# Patient Record
Sex: MALE | Race: WHITE | NOT HISPANIC OR LATINO | Employment: UNEMPLOYED | URBAN - NONMETROPOLITAN AREA
[De-identification: names, ages, dates, MRNs, and addresses within clinical notes are randomized per-mention and may not be internally consistent; named-entity substitution may affect disease eponyms.]

---

## 2017-04-28 ENCOUNTER — APPOINTMENT (EMERGENCY)
Dept: CT IMAGING | Facility: HOSPITAL | Age: 37
End: 2017-04-28
Payer: COMMERCIAL

## 2017-04-28 ENCOUNTER — HOSPITAL ENCOUNTER (EMERGENCY)
Facility: HOSPITAL | Age: 37
Discharge: HOME | End: 2017-04-29
Attending: EMERGENCY MEDICINE
Payer: COMMERCIAL

## 2017-04-28 DIAGNOSIS — E86.0 DEHYDRATION: ICD-10-CM

## 2017-04-28 DIAGNOSIS — R51.9 HEADACHE: Primary | ICD-10-CM

## 2017-04-28 LAB
ALBUMIN SERPL BCP-MCNC: 4.1 G/DL (ref 3.5–5)
ALP SERPL-CCNC: 71 U/L (ref 46–116)
ALT SERPL W P-5'-P-CCNC: 21 U/L (ref 12–78)
ANION GAP SERPL CALCULATED.3IONS-SCNC: 9 MMOL/L (ref 4–13)
AST SERPL W P-5'-P-CCNC: 15 U/L (ref 5–45)
BASOPHILS # BLD AUTO: 0.02 THOUSANDS/ΜL (ref 0–0.1)
BASOPHILS NFR BLD AUTO: 0 % (ref 0–1)
BILIRUB SERPL-MCNC: 0.5 MG/DL (ref 0.2–1)
BUN SERPL-MCNC: 12 MG/DL (ref 5–25)
CALCIUM SERPL-MCNC: 8.6 MG/DL (ref 8.3–10.1)
CHLORIDE SERPL-SCNC: 106 MMOL/L (ref 100–108)
CO2 SERPL-SCNC: 26 MMOL/L (ref 21–32)
CREAT SERPL-MCNC: 1.01 MG/DL (ref 0.6–1.3)
EOSINOPHIL # BLD AUTO: 0.02 THOUSAND/ΜL (ref 0–0.61)
EOSINOPHIL NFR BLD AUTO: 0 % (ref 0–6)
ERYTHROCYTE [DISTWIDTH] IN BLOOD BY AUTOMATED COUNT: 13.3 % (ref 11.6–15.1)
GFR SERPL CREATININE-BSD FRML MDRD: >60 ML/MIN/1.73SQ M
GLUCOSE SERPL-MCNC: 125 MG/DL (ref 65–140)
HCT VFR BLD AUTO: 39.7 % (ref 36.5–49.3)
HGB BLD-MCNC: 13.2 G/DL (ref 12–17)
LYMPHOCYTES # BLD AUTO: 1.38 THOUSANDS/ΜL (ref 0.6–4.47)
LYMPHOCYTES NFR BLD AUTO: 10 % (ref 14–44)
MCH RBC QN AUTO: 30.1 PG (ref 26.8–34.3)
MCHC RBC AUTO-ENTMCNC: 33.2 G/DL (ref 31.4–37.4)
MCV RBC AUTO: 90 FL (ref 82–98)
MONOCYTES # BLD AUTO: 0.82 THOUSAND/ΜL (ref 0.17–1.22)
MONOCYTES NFR BLD AUTO: 6 % (ref 4–12)
NEUTROPHILS # BLD AUTO: 11.69 THOUSANDS/ΜL (ref 1.85–7.62)
NEUTS SEG NFR BLD AUTO: 84 % (ref 43–75)
PLATELET # BLD AUTO: 242 THOUSANDS/UL (ref 149–390)
PMV BLD AUTO: 10.1 FL (ref 8.9–12.7)
POTASSIUM SERPL-SCNC: 3.3 MMOL/L (ref 3.5–5.3)
PROT SERPL-MCNC: 7.1 G/DL (ref 6.4–8.2)
RBC # BLD AUTO: 4.39 MILLION/UL (ref 3.88–5.62)
SODIUM SERPL-SCNC: 141 MMOL/L (ref 136–145)
WBC # BLD AUTO: 13.93 THOUSAND/UL (ref 4.31–10.16)

## 2017-04-28 PROCEDURE — 96361 HYDRATE IV INFUSION ADD-ON: CPT

## 2017-04-28 PROCEDURE — 80053 COMPREHEN METABOLIC PANEL: CPT | Performed by: EMERGENCY MEDICINE

## 2017-04-28 PROCEDURE — 70450 CT HEAD/BRAIN W/O DYE: CPT

## 2017-04-28 PROCEDURE — 36415 COLL VENOUS BLD VENIPUNCTURE: CPT | Performed by: EMERGENCY MEDICINE

## 2017-04-28 PROCEDURE — 85025 COMPLETE CBC W/AUTO DIFF WBC: CPT | Performed by: EMERGENCY MEDICINE

## 2017-04-28 PROCEDURE — 96374 THER/PROPH/DIAG INJ IV PUSH: CPT

## 2017-04-28 PROCEDURE — 96375 TX/PRO/DX INJ NEW DRUG ADDON: CPT

## 2017-04-28 RX ORDER — DIPHENHYDRAMINE HYDROCHLORIDE 50 MG/ML
50 INJECTION INTRAMUSCULAR; INTRAVENOUS ONCE
Status: COMPLETED | OUTPATIENT
Start: 2017-04-28 | End: 2017-04-28

## 2017-04-28 RX ORDER — KETOROLAC TROMETHAMINE 30 MG/ML
30 INJECTION, SOLUTION INTRAMUSCULAR; INTRAVENOUS ONCE
Status: COMPLETED | OUTPATIENT
Start: 2017-04-28 | End: 2017-04-28

## 2017-04-28 RX ORDER — ONDANSETRON 2 MG/ML
4 INJECTION INTRAMUSCULAR; INTRAVENOUS ONCE
Status: COMPLETED | OUTPATIENT
Start: 2017-04-28 | End: 2017-04-28

## 2017-04-28 RX ORDER — ACETAMINOPHEN 325 MG/1
650 TABLET ORAL ONCE
Status: COMPLETED | OUTPATIENT
Start: 2017-04-28 | End: 2017-04-28

## 2017-04-28 RX ADMIN — DIPHENHYDRAMINE HYDROCHLORIDE 50 MG: 50 INJECTION, SOLUTION INTRAMUSCULAR; INTRAVENOUS at 23:57

## 2017-04-28 RX ADMIN — ACETAMINOPHEN 650 MG: 325 TABLET, FILM COATED ORAL at 22:42

## 2017-04-28 RX ADMIN — SODIUM CHLORIDE 2000 ML: 0.9 INJECTION, SOLUTION INTRAVENOUS at 22:44

## 2017-04-28 RX ADMIN — KETOROLAC TROMETHAMINE 30 MG: 30 INJECTION, SOLUTION INTRAMUSCULAR at 23:57

## 2017-04-28 RX ADMIN — ONDANSETRON 4 MG: 2 INJECTION INTRAMUSCULAR; INTRAVENOUS at 23:58

## 2017-04-29 VITALS
TEMPERATURE: 97.9 F | HEIGHT: 69 IN | RESPIRATION RATE: 16 BRPM | DIASTOLIC BLOOD PRESSURE: 70 MMHG | BODY MASS INDEX: 20.08 KG/M2 | WEIGHT: 135.58 LBS | HEART RATE: 57 BPM | OXYGEN SATURATION: 99 % | SYSTOLIC BLOOD PRESSURE: 118 MMHG

## 2017-04-29 PROCEDURE — 99284 EMERGENCY DEPT VISIT MOD MDM: CPT

## 2018-02-17 ENCOUNTER — APPOINTMENT (EMERGENCY)
Dept: CT IMAGING | Facility: HOSPITAL | Age: 38
End: 2018-02-17
Payer: COMMERCIAL

## 2018-02-17 ENCOUNTER — HOSPITAL ENCOUNTER (EMERGENCY)
Facility: HOSPITAL | Age: 38
Discharge: HOME | End: 2018-02-17
Admitting: EMERGENCY MEDICINE
Payer: COMMERCIAL

## 2018-02-17 VITALS
TEMPERATURE: 98.1 F | RESPIRATION RATE: 18 BRPM | HEIGHT: 69 IN | SYSTOLIC BLOOD PRESSURE: 120 MMHG | DIASTOLIC BLOOD PRESSURE: 69 MMHG | BODY MASS INDEX: 21.17 KG/M2 | WEIGHT: 142.9 LBS | OXYGEN SATURATION: 99 % | HEART RATE: 74 BPM

## 2018-02-17 DIAGNOSIS — R51.9 HEADACHE: Primary | ICD-10-CM

## 2018-02-17 LAB
ANION GAP SERPL CALCULATED.3IONS-SCNC: 7 MMOL/L (ref 4–13)
BASOPHILS # BLD AUTO: 0.02 THOUSANDS/ΜL (ref 0–0.1)
BASOPHILS NFR BLD AUTO: 0 % (ref 0–1)
BUN SERPL-MCNC: 18 MG/DL (ref 5–25)
CALCIUM SERPL-MCNC: 8.7 MG/DL (ref 8.3–10.1)
CHLORIDE SERPL-SCNC: 102 MMOL/L (ref 100–108)
CO2 SERPL-SCNC: 31 MMOL/L (ref 21–32)
CREAT SERPL-MCNC: 1.25 MG/DL (ref 0.6–1.3)
EOSINOPHIL # BLD AUTO: 0.07 THOUSAND/ΜL (ref 0–0.61)
EOSINOPHIL NFR BLD AUTO: 1 % (ref 0–6)
ERYTHROCYTE [DISTWIDTH] IN BLOOD BY AUTOMATED COUNT: 13.2 % (ref 11.6–15.1)
GAS + CO PNL BLDA: 1.7 % (ref 0–1.5)
GFR SERPL CREATININE-BSD FRML MDRD: 74 ML/MIN/1.73SQ M
GLUCOSE SERPL-MCNC: 89 MG/DL (ref 65–140)
HCT VFR BLD AUTO: 41.6 % (ref 36.5–49.3)
HGB BLD-MCNC: 14 G/DL (ref 12–17)
LYMPHOCYTES # BLD AUTO: 1.76 THOUSANDS/ΜL (ref 0.6–4.47)
LYMPHOCYTES NFR BLD AUTO: 18 % (ref 14–44)
MCH RBC QN AUTO: 30.8 PG (ref 26.8–34.3)
MCHC RBC AUTO-ENTMCNC: 33.7 G/DL (ref 31.4–37.4)
MCV RBC AUTO: 91 FL (ref 82–98)
MONOCYTES # BLD AUTO: 0.57 THOUSAND/ΜL (ref 0.17–1.22)
MONOCYTES NFR BLD AUTO: 6 % (ref 4–12)
NEUTROPHILS # BLD AUTO: 7.13 THOUSANDS/ΜL (ref 1.85–7.62)
NEUTS SEG NFR BLD AUTO: 75 % (ref 43–75)
PLATELET # BLD AUTO: 258 THOUSANDS/UL (ref 149–390)
PMV BLD AUTO: 9.8 FL (ref 8.9–12.7)
POTASSIUM SERPL-SCNC: 4 MMOL/L (ref 3.5–5.3)
RBC # BLD AUTO: 4.55 MILLION/UL (ref 3.88–5.62)
SODIUM SERPL-SCNC: 140 MMOL/L (ref 136–145)
WBC # BLD AUTO: 9.55 THOUSAND/UL (ref 4.31–10.16)

## 2018-02-17 PROCEDURE — 99284 EMERGENCY DEPT VISIT MOD MDM: CPT

## 2018-02-17 PROCEDURE — 96365 THER/PROPH/DIAG IV INF INIT: CPT

## 2018-02-17 PROCEDURE — 85025 COMPLETE CBC W/AUTO DIFF WBC: CPT | Performed by: PHYSICIAN ASSISTANT

## 2018-02-17 PROCEDURE — 80048 BASIC METABOLIC PNL TOTAL CA: CPT | Performed by: PHYSICIAN ASSISTANT

## 2018-02-17 PROCEDURE — 70450 CT HEAD/BRAIN W/O DYE: CPT

## 2018-02-17 PROCEDURE — 82375 ASSAY CARBOXYHB QUANT: CPT | Performed by: PHYSICIAN ASSISTANT

## 2018-02-17 PROCEDURE — 96375 TX/PRO/DX INJ NEW DRUG ADDON: CPT

## 2018-02-17 RX ORDER — KETOROLAC TROMETHAMINE 30 MG/ML
15 INJECTION, SOLUTION INTRAMUSCULAR; INTRAVENOUS ONCE
Status: COMPLETED | OUTPATIENT
Start: 2018-02-17 | End: 2018-02-17

## 2018-02-17 RX ORDER — MAGNESIUM SULFATE HEPTAHYDRATE 40 MG/ML
2 INJECTION, SOLUTION INTRAVENOUS ONCE
Status: DISCONTINUED | OUTPATIENT
Start: 2018-02-17 | End: 2018-02-17

## 2018-02-17 RX ORDER — DEXAMETHASONE SODIUM PHOSPHATE 10 MG/ML
10 INJECTION, SOLUTION INTRAMUSCULAR; INTRAVENOUS ONCE
Status: COMPLETED | OUTPATIENT
Start: 2018-02-17 | End: 2018-02-17

## 2018-02-17 RX ORDER — IBUPROFEN 200 MG
600 TABLET ORAL EVERY 6 HOURS PRN
Qty: 30 TABLET | Refills: 0 | Status: SHIPPED | OUTPATIENT
Start: 2018-02-17 | End: 2018-05-24 | Stop reason: ALTCHOICE

## 2018-02-17 RX ORDER — ACETAMINOPHEN 325 MG/1
650 TABLET ORAL EVERY 6 HOURS PRN
Qty: 30 TABLET | Refills: 0 | Status: SHIPPED | OUTPATIENT
Start: 2018-02-17 | End: 2018-05-24 | Stop reason: ALTCHOICE

## 2018-02-17 RX ORDER — MAGNESIUM SULFATE HEPTAHYDRATE 40 MG/ML
2 INJECTION, SOLUTION INTRAVENOUS ONCE
Status: COMPLETED | OUTPATIENT
Start: 2018-02-17 | End: 2018-02-17

## 2018-02-17 RX ADMIN — MAGNESIUM SULFATE HEPTAHYDRATE 2 G: 40 INJECTION, SOLUTION INTRAVENOUS at 17:56

## 2018-02-17 RX ADMIN — KETOROLAC TROMETHAMINE 15 MG: 30 INJECTION, SOLUTION INTRAMUSCULAR at 17:49

## 2018-02-17 RX ADMIN — SODIUM CHLORIDE 500 ML: 0.9 INJECTION, SOLUTION INTRAVENOUS at 17:53

## 2018-02-17 RX ADMIN — DEXAMETHASONE SODIUM PHOSPHATE 10 MG: 10 INJECTION, SOLUTION INTRAMUSCULAR; INTRAVENOUS at 17:51

## 2018-02-17 NOTE — DISCHARGE INSTRUCTIONS
Tension Headache   WHAT YOU NEED TO KNOW:   Tension headaches are most often caused by stress, eye strain, or muscle tightness  The pain of a tension headache may start in the forehead or the back of the head  The pain often spreads over the whole head and down into the neck and shoulders  Over-the-counter pain medicine is the most useful and common treatment for a tension headache  Exercise, biofeedback, meditation, or relaxation techniques may also decrease your headache pain  DISCHARGE INSTRUCTIONS:   Return to the emergency department if:   · You have a sudden headache that seems different or much worse than your usual headaches  · You have difficulty seeing, speaking, or moving  · You pass out, become confused, or have a seizure  · You have a headache, fever, and a stiff neck  Contact your healthcare provider if:   · Your headaches continue to get worse  · Your headaches happen so often that they affect your ability to do your work or normal activities  · You need to take medicine to help your headaches more often than your healthcare provider says you should  · Your headaches get so bad that they cause you to vomit  · You have questions or concerns about your condition or care  Medicines:   · NSAIDs , such as ibuprofen, help decrease swelling, pain, and fever  This medicine is available with or without a doctor's order  NSAIDs can cause stomach bleeding or kidney problems in certain people  If you take blood thinner medicine, always ask your healthcare provider if NSAIDs are safe for you  Always read the medicine label and follow directions  · Acetaminophen  decreases pain and fever  It is available without a doctor's order  Ask how much to take and how often to take it  Follow directions  Read the labels of all other medicines you are using to see if they also contain acetaminophen, or ask your doctor or pharmacist  Acetaminophen can cause liver damage if not taken correctly   Do not use more than 4 grams (4,000 milligrams) total of acetaminophen in one day  · Take your medicine as directed  Contact your healthcare provider if you think your medicine is not helping or if you have side effects  Tell him of her if you are allergic to any medicine  Keep a list of the medicines, vitamins, and herbs you take  Include the amounts, and when and why you take them  Bring the list or the pill bottles to follow-up visits  Carry your medicine list with you in case of an emergency  Manage your symptoms:   · Keep a headache record  Include when the headaches start and stop and what made them better  Describe your symptoms, such as how the pain feels, where it is, and how bad it is  Record anything you ate or drank for the past 24 hours before your headache  Bring this to follow-up visits  · Apply heat as directed  Heat may help decrease headache pain and muscle spasms  Apply heat on the area for 20 to 30 minutes every 2 hours for as many days as directed  A warm bath may also help relieve muscle tension and spasms  · Apply ice as directed  Ice may help decrease headache pain  Use an ice pack or put crushed ice in a plastic bag  Cover it with a towel and place it on the area for 15 to 20 minutes every hour as directed  Prevent tension headaches:   · Avoid muscle tension  Do not stay in one position for long periods of time  Use a different pillow if you wake up with sore neck and shoulder muscles  Find ways to relax your muscles, such as massage or resting in a quiet, dark room  · Avoid eye strain  Make sure you have good lighting when you read, sew, or do similar activities  Get yearly eye exams and wear glasses as directed  · Get enough sleep  Get 8 to 10 hours of sleep each night  Create a sleep schedule  Go to bed and wake up at the same times each day  It may be helpful to do something relaxing before bed  Do not watch television right before bed      · Eat a variety of healthy foods  Healthy foods include fruits, vegetables, whole-grain breads, low-fat dairy products, beans, lean meats, and fish  Do not eat foods that trigger your headaches  · Exercise regularly  Exercise helps decrease stress and headaches  Ask about the best exercise plan for you  · Drink liquids as directed  You may need to drink more liquid to prevent dehydration  Dehydration can make a tension headache worse  Ask your healthcare provider how much liquid to drink and which liquids are best for you  Limit caffeine as directed  Caffeine may make a tension headache worse  · Do not drink alcohol  Alcohol can trigger a headache  It can also prevent medicines from stopping your headache  · Do not smoke  Nicotine and other chemicals in cigarettes and cigars can trigger a headache and also cause lung damage  Ask your healthcare provider for information if you currently smoke and need help to quit  E-cigarettes or smokeless tobacco still contain nicotine  Talk to your healthcare provider before you use these products  Follow up with your healthcare provider as directed:  Bring the headache record with you  Write down your questions so you remember to ask them during your visits  © 2017 [facility] Information is for End User's use only and may not be sold, redistributed or otherwise used for commercial purposes  All illustrations and images included in CareNotes® are the copyrighted property of A D A M [employer]  or [address]  The above information is an  only  It is not intended as medical advice for individual conditions or treatments  Talk to your doctor, nurse or pharmacist before following any medical regimen to see if it is safe and effective for you  Acute Headache, Ambulatory Care   GENERAL INFORMATION:   An acute headache  is pain or discomfort that starts suddenly and gets worse quickly  The cause of an acute headache may not be known   It may be triggered by stress, fatigue, hormones, food, or trauma  Common related symptoms include the following:   · Fever    · Sinus pressure    · Loss of memory    · Nausea or vomiting    · Problems with your vision, such as watery or red eyes, loss of vision, or pain in bright light    · Stiff neck    · Tenderness of the head and neck area    · Trouble staying awake, or being less alert than usual     · Weakness or less energy  Seek immediate care for the following symptoms:   · Severe pain    · A headache that occurs after a blow to the head, a fall, or other trauma     · Confusion or forgetfulness    · Numbness on one side of your face or body  Treatment for an acute headache  may include medicine to decrease pain  You may also need biofeedback or cognitive behavioral therapy  Ask your healthcare provider about these and other treatments for an acute headache  Manage my symptoms:   · Apply heat  on your head for 20 to 30 minutes every 2 hours for as many days as directed  Heat helps decrease pain and muscle spasms  You may alternate heat and ice  · Apply ice  on your head for 15 to 20 minutes every hour or as directed  Use an ice pack, or put crushed ice in a plastic bag  Cover it with a towel  Ice helps decrease pain  · Relax your muscles  Lie down in a comfortable position and close your eyes  Relax your muscles slowly  Start at your toes and work your way up your body  · Keep a record of your headaches  Write down when your headaches start and stop  Include your symptoms and what you were doing when the headache began  Record what you ate or drank for 24 hours before the headache started  Describe the pain and where it hurts  Keep track of what you did to treat your headache and whether it worked  Follow up with your healthcare provider as directed:  Bring your headache record with you when you see your healthcare provider   Write down your questions so you remember to ask them during your visits  CARE AGREEMENT:   You have the right to help plan your care  Learn about your health condition and how it may be treated  Discuss treatment options with your caregivers to decide what care you want to receive  You always have the right to refuse treatment  The above information is an  only  It is not intended as medical advice for individual conditions or treatments  Talk to your doctor, nurse or pharmacist before following any medical regimen to see if it is safe and effective for you  © 2014 [address] is for End User's use only and may not be sold, redistributed or otherwise used for commercial purposes  All illustrations and images included in CareNotes® are the copyrighted property of [employer]  or Micha Jaime

## 2018-02-17 NOTE — ED PROVIDER NOTES
History  Chief Complaint   Patient presents with    Headache - Recurrent or Known Dx Migraines     Intermittent headache for three weeks       History provided by:  Patient and significant other  Headache   Pain location:  Generalized (top and back of head radiates to both sides and forehead)  Quality:  Dull  Radiates to:  Does not radiate  Severity currently:  9/10  Severity at highest:  10/10  Onset quality:  Gradual  Duration:  3 weeks  Timing:  Constant  Progression:  Unchanged  Chronicity:  New  Similar to prior headaches: yes    Context: not exposure to bright light, not caffeine, not intercourse and not loud noise    Context comment:  Headaches for 3 weeks  took 650mg of tylenol yesterday for first time which took the pain completely away for about 1-2 hours before it recurred  no injury at onset  no neck pain  no eye pain  no uri sx  no dental pain  no dx of migraines  not follow pcp  Relieved by:  Nothing  Worsened by:  Nothing  Ineffective treatments:  None tried  Associated symptoms: no abdominal pain, no back pain, no blurred vision, no congestion, no cough, no diarrhea, no dizziness, no ear pain, no eye pain, no fatigue, no fever, no hearing loss, no loss of balance, no myalgias, no nausea, no near-syncope, no neck pain, no neck stiffness, no numbness, no paresthesias, no photophobia, no seizures, no sinus pressure, no sore throat, no syncope, no tingling, no URI, no visual change, no vomiting and no weakness        None       Past Medical History:   Diagnosis Date    Asthma        History reviewed  No pertinent surgical history  History reviewed  No pertinent family history  I have reviewed and agree with the history as documented      Social History   Substance Use Topics    Smoking status: Never Smoker    Smokeless tobacco: Never Used    Alcohol use No        Review of Systems   Constitutional: Negative for activity change, appetite change, chills, diaphoresis, fatigue, fever and unexpected weight change  HENT: Negative for congestion, ear pain, hearing loss, rhinorrhea, sinus pressure, sore throat and tinnitus  Eyes: Negative for blurred vision, photophobia, pain and visual disturbance  Respiratory: Negative for cough, chest tightness, shortness of breath and wheezing  Cardiovascular: Negative for chest pain, palpitations, leg swelling, syncope and near-syncope  Gastrointestinal: Negative for abdominal pain, constipation, diarrhea, nausea and vomiting  Genitourinary: Negative for dysuria, flank pain, frequency, hematuria and urgency  Musculoskeletal: Negative for arthralgias, back pain, myalgias, neck pain and neck stiffness  Skin: Negative for rash and wound  Allergic/Immunologic: Negative for immunocompromised state  Neurological: Positive for headaches  Negative for dizziness, tremors, seizures, syncope, facial asymmetry, speech difficulty, weakness, light-headedness, numbness, paresthesias and loss of balance  Physical Exam  ED Triage Vitals [02/17/18 1642]   Temperature Pulse Respirations Blood Pressure SpO2   98 1 °F (36 7 °C) 74 18 135/75 97 %      Temp Source Heart Rate Source Patient Position - Orthostatic VS BP Location FiO2 (%)   Temporal Monitor Lying Left arm --      Pain Score       8           Orthostatic Vital Signs  Vitals:    02/17/18 1642 02/17/18 1700 02/17/18 1759   BP: 135/75 116/73 118/66   Pulse: 74 74 72   Patient Position - Orthostatic VS: Lying Lying Lying       Physical Exam   Constitutional: He is oriented to person, place, and time  He appears well-developed and well-nourished  No distress  HENT:   Head: Normocephalic and atraumatic  Right Ear: External ear normal    Left Ear: External ear normal    Nose: Nose normal    Mouth/Throat: Oropharynx is clear and moist    Eyes: Conjunctivae and EOM are normal  Pupils are equal, round, and reactive to light  Neck: Normal range of motion  Neck supple     Cardiovascular: Normal rate, regular rhythm, normal heart sounds and intact distal pulses  No murmur heard  Pulmonary/Chest: Effort normal and breath sounds normal    Abdominal: Soft  Bowel sounds are normal    Musculoskeletal: Normal range of motion  He exhibits no edema or tenderness  Neurological: He is alert and oriented to person, place, and time  Aaox4  Mental status normal and appropriate  Judgment and analogies appropriate  Cognition and memory intact  CN 2-12 intact   strength 5/5 bilat  Follows all commands, normal heel-down-shin and finger-to-nose coordination  Steady unassisted tandem gait  Sensation intact to light touch x 4 extremities  Skin: Skin is warm and dry  He is not diaphoretic  Psychiatric: He has a normal mood and affect  Nursing note and vitals reviewed  ED Medications  Medications   sodium chloride 0 9 % bolus 500 mL (0 mL Intravenous Stopped 2/17/18 1824)   ketorolac (TORADOL) injection 15 mg (15 mg Intravenous Given 2/17/18 1749)   dexamethasone (PF) (DECADRON) injection 10 mg (10 mg Intravenous Given 2/17/18 1751)   magnesium sulfate 2 g/50 mL IVPB (premix) 2 g (0 g Intravenous Stopped 2/17/18 1824)       Diagnostic Studies  Results Reviewed     Procedure Component Value Units Date/Time    Basic metabolic panel [[id number]] Collected:  02/17/18 1744    Lab Status:  Final result Specimen:  Blood from Arm, Right Updated:  02/17/18 1809     Sodium 140 mmol/L      Potassium 4 0 mmol/L      Chloride 102 mmol/L      CO2 31 mmol/L      Anion Gap 7 mmol/L      BUN 18 mg/dL      Creatinine 1 25 mg/dL      Glucose 89 mg/dL      Calcium 8 7 mg/dL      eGFR 74 ml/min/1 73sq m     Narrative:         National Kidney Disease Education Program recommendations are as follows:  GFR calculation is accurate only with a steady state creatinine  Chronic Kidney disease less than 60 ml/min/1 73 sq  meters  Kidney failure less than 15 ml/min/1 73 sq  meters      Carboxyhemoglobin [[id number]]  (Abnormal) Collected:  02/17/18 1744 Lab Status:  Final result Specimen:  Blood from Arm, Right Updated:  02/17/18 1806     Carbon Monoxide, Blood 1 7 (H) %     Narrative:         Normal Carboxyhemoglobin range for nonsmokers is <1 5%   Normal Carboxyhemoglobin range for smokers is 1 5% to 5 1%     CBC and differential [[id number]]  (Normal) Collected:  02/17/18 1744    Lab Status:  Final result Specimen:  Blood from Arm, Right Updated:  02/17/18 1800     WBC 9 55 Thousand/uL      RBC 4 55 Million/uL      Hemoglobin 14 0 g/dL      Hematocrit 41 6 %      MCV 91 fL      MCH 30 8 pg      MCHC 33 7 g/dL      RDW 13 2 %      MPV 9 8 fL      Platelets 523 Thousands/uL      Neutrophils Relative 75 %      Lymphocytes Relative 18 %      Monocytes Relative 6 %      Eosinophils Relative 1 %      Basophils Relative 0 %      Neutrophils Absolute 7 13 Thousands/µL      Lymphocytes Absolute 1 76 Thousands/µL      Monocytes Absolute 0 57 Thousand/µL      Eosinophils Absolute 0 07 Thousand/µL      Basophils Absolute 0 02 Thousands/µL                  CT head without contrast   Final Result by Donnie Huff MD (02/17 1732)      No acute intracranial abnormality           Workstation performed: [id number]                    Procedures  Procedures       Phone Contacts  ED Phone Contact    ED Course  ED Course as of Feb 17 1837   Sat Feb 17, 2018   1811 Carbon Monoxide, Blood: (!) 1 7   1811 WBC: 9 55   1811 Hemoglobin: 14 0   1811 Hematocrit: 41 6   1811 Platelets: 803   9667 Sodium: 140   1811 Potassium: 4 0   1811 Chloride: 102   1811 CO2: 31   1811 Anion Gap: 7   1811 BUN: 18   1811 Creatinine: 1 25   1811 Glucose: 89   1811 eGFR: 74       MDM  Number of Diagnoses or Management Options  Headache: new and requires workup     Amount and/or Complexity of Data Reviewed  Clinical lab tests: ordered and reviewed  Tests in the radiology section of CPT®: ordered and reviewed    Patient Progress  Patient progress: stable    CritCare Time    Disposition  Final diagnoses: Headache     Time reflects when diagnosis was documented in both MDM as applicable and the Disposition within this note     Time User Action Codes Description Comment    2/17/2018  6:12 PM Jose Alfredo 59, [address] [address] Headache       ED Disposition     ED Disposition Condition Comment    Discharge  Cindy Wells discharge to home/self care  Condition at discharge: Good        Follow-up Information     Follow up With Specialties Details Why [facility],  Family Medicine Schedule an appointment as soon as possible for a visit ER followup regarding headaches [address] [place] [address]  [phone]          Patient's Medications   Discharge Prescriptions    ACETAMINOPHEN (TYLENOL) 325 MG TABLET    Take 2 tablets (650 mg total) by mouth every 6 (six) hours as needed for headaches       Start Date: 2/17/2018 End Date: --       Order Dose: 650 mg       Quantity: 30 tablet    Refills: 0    IBUPROFEN (MOTRIN) 200 MG TABLET    Take 3 tablets (600 mg total) by mouth every 6 (six) hours as needed for headaches       Start Date: 2/17/2018 End Date: --       Order Dose: 600 mg       Quantity: 30 tablet    Refills: 0     No discharge procedures on file      ED Provider  Electronically Signed by           Yaima Guardado PA-C  02/17/18 3558

## 2018-05-24 ENCOUNTER — HOSPITAL ENCOUNTER (EMERGENCY)
Facility: HOSPITAL | Age: 38
Discharge: HOME | End: 2018-05-24
Admitting: EMERGENCY MEDICINE
Payer: COMMERCIAL

## 2018-05-24 VITALS
RESPIRATION RATE: 18 BRPM | BODY MASS INDEX: 21.39 KG/M2 | TEMPERATURE: 97.9 F | WEIGHT: 144.4 LBS | DIASTOLIC BLOOD PRESSURE: 61 MMHG | HEART RATE: 64 BPM | SYSTOLIC BLOOD PRESSURE: 102 MMHG | OXYGEN SATURATION: 97 % | HEIGHT: 69 IN

## 2018-05-24 DIAGNOSIS — J32.9 SINUSITIS: Primary | ICD-10-CM

## 2018-05-24 PROCEDURE — 99283 EMERGENCY DEPT VISIT LOW MDM: CPT

## 2018-05-24 RX ORDER — AMOXICILLIN AND CLAVULANATE POTASSIUM 875; 125 MG/1; MG/1
1 TABLET, FILM COATED ORAL 2 TIMES DAILY
Qty: 14 TABLET | Refills: 0 | Status: SHIPPED | OUTPATIENT
Start: 2018-05-24 | End: 2018-05-31

## 2018-05-24 RX ORDER — FLUTICASONE PROPIONATE 50 MCG
2 SPRAY, SUSPENSION (ML) NASAL DAILY
Qty: 16 G | Refills: 0 | Status: SHIPPED | OUTPATIENT
Start: 2018-05-24 | End: 2018-08-16 | Stop reason: ALTCHOICE

## 2018-05-24 RX ORDER — ACETAMINOPHEN 325 MG/1
650 TABLET ORAL EVERY 6 HOURS PRN
Qty: 30 TABLET | Refills: 0 | Status: SHIPPED | OUTPATIENT
Start: 2018-05-24 | End: 2018-08-16 | Stop reason: ALTCHOICE

## 2018-05-24 RX ORDER — IBUPROFEN 600 MG/1
600 TABLET ORAL EVERY 8 HOURS PRN
Qty: 15 TABLET | Refills: 0 | Status: SHIPPED | OUTPATIENT
Start: 2018-05-24 | End: 2018-08-16 | Stop reason: ALTCHOICE

## 2018-05-24 NOTE — DISCHARGE INSTRUCTIONS

## 2018-05-25 NOTE — ED PROVIDER NOTES
History  Chief Complaint   Patient presents with    Headache     Patient states for the last week he has been having a sinus pressure radiating into his head causing a headache  Non productive cough, fever, chills, sore throat, fatigued    URI       History provided by:  Patient  Headache   Associated symptoms: congestion, ear pain, facial pain, sinus pressure, sore throat and URI    Associated symptoms: no abdominal pain, no back pain, no cough, no diarrhea, no dizziness, no eye pain, no fatigue, no fever, no myalgias, no nausea, no neck pain, no numbness, no seizures, no swollen glands, no vomiting and no weakness    URI   Presenting symptoms: congestion, ear pain, facial pain, rhinorrhea and sore throat    Presenting symptoms: no cough, no fatigue and no fever    Congestion:     Location:  Nasal    Interferes with sleep: yes      Interferes with eating/drinking: no    Ear pain:     Location:  Bilateral    Severity:  Moderate    Onset quality:  Gradual    Duration:  1 day    Timing:  Constant    Progression:  Unchanged    Chronicity:  New  Rhinorrhea:     Quality:  Yellow    Severity:  Moderate    Duration:  7 days    Timing:  Constant    Progression:  Worsening  Sore throat:     Severity:  Mild    Timing:  Intermittent  Severity:  Mild  Onset quality:  Gradual  Duration:  7 days  Timing:  Constant  Progression:  Worsening  Chronicity:  New  Relieved by:  Nothing  Worsened by:  Nothing  Ineffective treatments: acetaminophen 2 doses of 650mg yesterday    Associated symptoms: headaches and sinus pain    Associated symptoms: no arthralgias, no myalgias, no neck pain, no sneezing, no swollen glands and no wheezing    Headaches:     Severity:  Moderate    Onset quality:  Gradual    Duration:  4 days    Timing:  Constant    Progression:  Unchanged (face and forehead)    Chronicity:  New  Risk factors: sick contacts (household uri)    Risk factors: no immunosuppression and no recent illness        None       Past Medical History:   Diagnosis Date    Asthma        History reviewed  No pertinent surgical history  History reviewed  No pertinent family history  I have reviewed and agree with the history as documented  Social History   Substance Use Topics    Smoking status: Never Smoker    Smokeless tobacco: Never Used    Alcohol use No        Review of Systems   Constitutional: Negative for activity change, appetite change, chills, diaphoresis, fatigue, fever and unexpected weight change  HENT: Positive for congestion, ear pain, rhinorrhea, sinus pain, sinus pressure and sore throat  Negative for sneezing, tinnitus and trouble swallowing  Eyes: Negative for pain, redness and visual disturbance  Respiratory: Negative for cough, chest tightness, shortness of breath and wheezing  Cardiovascular: Negative for chest pain, palpitations and leg swelling  Gastrointestinal: Negative for abdominal pain, constipation, diarrhea, nausea and vomiting  Genitourinary: Negative for dysuria, flank pain, frequency, hematuria and urgency  Musculoskeletal: Negative for arthralgias, back pain, myalgias and neck pain  Skin: Negative for rash and wound  Neurological: Positive for headaches  Negative for dizziness, tremors, seizures, syncope, facial asymmetry, speech difficulty, weakness, light-headedness and numbness  Physical Exam  Physical Exam   Constitutional: He is oriented to person, place, and time  He appears well-developed and well-nourished  He is active and cooperative  Non-toxic appearance  No distress  HENT:   Head: Normocephalic and atraumatic  Right Ear: External ear and ear canal normal  Tympanic membrane is not erythematous and not bulging  A middle ear effusion is present  Left Ear: External ear and ear canal normal  Tympanic membrane is not erythematous and not bulging  A middle ear effusion is present  Nose: Mucosal edema and rhinorrhea present   Right sinus exhibits maxillary sinus tenderness and frontal sinus tenderness  Left sinus exhibits maxillary sinus tenderness and frontal sinus tenderness  Mouth/Throat: Uvula is midline, oropharynx is clear and moist and mucous membranes are normal  No oral lesions  No trismus in the jaw  No uvula swelling  No oropharyngeal exudate, posterior oropharyngeal edema, posterior oropharyngeal erythema or tonsillar abscesses  No tonsillar exudate  Eyes: Conjunctivae and EOM are normal  Pupils are equal, round, and reactive to light  Right eye exhibits no discharge  Left eye exhibits no discharge  Neck: Normal range of motion  Neck supple  Cardiovascular: Normal rate, regular rhythm, normal heart sounds and intact distal pulses  No murmur heard  Pulmonary/Chest: Effort normal and breath sounds normal  No respiratory distress  He has no wheezes  He exhibits no tenderness  Abdominal: Soft  Bowel sounds are normal    Musculoskeletal: He exhibits no edema  Lymphadenopathy:     He has no cervical adenopathy  Neurological: He is alert and oriented to person, place, and time  No cranial nerve deficit or sensory deficit  Skin: Skin is warm and dry  Capillary refill takes less than 2 seconds  He is not diaphoretic  No erythema  No pallor  Psychiatric: He has a normal mood and affect  Nursing note and vitals reviewed        Vital Signs  ED Triage Vitals [05/24/18 1631]   Temperature Pulse Respirations Blood Pressure SpO2   97 9 °F (36 6 °C) 64 18 102/61 97 %      Temp Source Heart Rate Source Patient Position - Orthostatic VS BP Location FiO2 (%)   Temporal Monitor Sitting Right arm --      Pain Score       Worst Possible Pain           Vitals:    05/24/18 1631   BP: 102/61   Pulse: 64   Patient Position - Orthostatic VS: Sitting       Visual Acuity      ED Medications  Medications - No data to display    Diagnostic Studies  Results Reviewed     None                 No orders to display              Procedures  Procedures       Phone Contacts  ED Phone Contact    ED Course                               MDM  Number of Diagnoses or Management Options  Sinusitis: new and does not require workup  Patient Progress  Patient progress: stable    CritCare Time    Disposition  Final diagnoses:   Sinusitis     Time reflects when diagnosis was documented in both MDM as applicable and the Disposition within this note     Time User Action Codes Description Comment    5/24/2018  4:51 PM Gisel Dos Santos [J32 9] Sinusitis       ED Disposition     ED Disposition Condition Comment    Discharge  Lodema Speak discharge to home/self care  Condition at discharge: Good        Follow-up Information     Follow up With Specialties Details Why [address] [place] [address],  Family Medicine Schedule an appointment as soon as possible for a visit Seen in ER need followup for illness [address] [place] [address]  [phone]            Discharge Medication List as of 5/24/2018  4:52 PM      START taking these medications    Details   !! acetaminophen (TYLENOL) 325 mg tablet Take 2 tablets (650 mg total) by mouth every 6 (six) hours as needed for mild pain, moderate pain, severe pain, headaches or fever, Starting Thu 5/24/2018, Print      amoxicillin-clavulanate (AUGMENTIN) 875-125 mg per tablet Take 1 tablet by mouth 2 (two) times a day for 7 days, Starting Thu 5/24/2018, Until Thu 5/31/2018, Print      fluticasone (FLONASE) 50 mcg/act nasal spray 2 sprays into each nostril daily, Starting Thu 5/24/2018, Print      !! ibuprofen (MOTRIN) 600 mg tablet Take 1 tablet (600 mg total) by mouth every 8 (eight) hours as needed for mild pain or moderate pain for up to 5 days, Starting Thu 5/24/2018, Until Tue 5/29/2018, Print       !! - Potential duplicate medications found  Please discuss with provider        CONTINUE these medications which have NOT CHANGED    Details   !! acetaminophen (TYLENOL) 325 mg tablet Take 2 tablets (650 mg total) by mouth every 6 (six) hours as needed for headaches, Starting Sat 2/17/2018, Print      !! ibuprofen (MOTRIN) 200 mg tablet Take 3 tablets (600 mg total) by mouth every 6 (six) hours as needed for headaches, Starting Sat 2/17/2018, Print       !! - Potential duplicate medications found  Please discuss with provider  No discharge procedures on file      ED Provider  Electronically Signed by           Duane Bernabe PA-C  05/25/18 1037

## 2018-06-26 ENCOUNTER — NON-PROVIDER VISIT (OUTPATIENT)
Dept: URGENT CARE | Facility: PHYSICIAN GROUP | Age: 38
End: 2018-06-26

## 2018-06-26 DIAGNOSIS — Z02.1 PRE-EMPLOYMENT DRUG SCREENING: ICD-10-CM

## 2018-06-26 LAB
AMP AMPHETAMINE: NORMAL
COC COCAINE: NORMAL
INT CON NEG: NORMAL
INT CON POS: NORMAL
MET METHAMPHETAMINES: NORMAL
OPI OPIATES: NORMAL
PCP PHENCYCLIDINE: NORMAL
POC DRUG COMMENT 753798-OCCUPATIONAL HEALTH: NORMAL
THC: NORMAL

## 2018-06-26 PROCEDURE — 80305 DRUG TEST PRSMV DIR OPT OBS: CPT | Performed by: PHYSICIAN ASSISTANT

## 2018-08-16 ENCOUNTER — HOSPITAL ENCOUNTER (EMERGENCY)
Facility: HOSPITAL | Age: 38
Discharge: HOME | End: 2018-08-16
Attending: EMERGENCY MEDICINE
Payer: COMMERCIAL

## 2018-08-16 VITALS
RESPIRATION RATE: 16 BRPM | WEIGHT: 145.2 LBS | BODY MASS INDEX: 21.44 KG/M2 | TEMPERATURE: 98.3 F | HEART RATE: 67 BPM | OXYGEN SATURATION: 97 % | SYSTOLIC BLOOD PRESSURE: 114 MMHG | DIASTOLIC BLOOD PRESSURE: 64 MMHG

## 2018-08-16 DIAGNOSIS — B02.9 SHINGLES: Primary | ICD-10-CM

## 2018-08-16 LAB — TROPONIN I SERPL-MCNC: <0.02 NG/ML

## 2018-08-16 PROCEDURE — 99283 EMERGENCY DEPT VISIT LOW MDM: CPT

## 2018-08-16 PROCEDURE — 36415 COLL VENOUS BLD VENIPUNCTURE: CPT | Performed by: EMERGENCY MEDICINE

## 2018-08-16 PROCEDURE — 93005 ELECTROCARDIOGRAM TRACING: CPT

## 2018-08-16 PROCEDURE — 84484 ASSAY OF TROPONIN QUANT: CPT | Performed by: EMERGENCY MEDICINE

## 2018-08-16 RX ORDER — ACYCLOVIR 800 MG/1
800 TABLET ORAL
Qty: 35 TABLET | Refills: 0 | Status: SHIPPED | OUTPATIENT
Start: 2018-08-16 | End: 2021-01-14

## 2018-08-16 RX ORDER — NAPROXEN 250 MG/1
500 TABLET ORAL 2 TIMES DAILY WITH MEALS
Status: DISCONTINUED | OUTPATIENT
Start: 2018-08-16 | End: 2018-08-16 | Stop reason: HOSPADM

## 2018-08-16 RX ORDER — TRAMADOL HYDROCHLORIDE 50 MG/1
50 TABLET ORAL EVERY 6 HOURS PRN
Qty: 20 TABLET | Refills: 0 | Status: SHIPPED | OUTPATIENT
Start: 2018-08-16 | End: 2021-01-14

## 2018-08-16 RX ORDER — LIDOCAINE 40 MG/G
CREAM TOPICAL AS NEEDED
Qty: 30 G | Refills: 0 | Status: SHIPPED | OUTPATIENT
Start: 2018-08-16 | End: 2021-01-14

## 2018-08-16 RX ORDER — NAPROXEN 500 MG/1
500 TABLET ORAL 2 TIMES DAILY WITH MEALS
Qty: 30 TABLET | Refills: 0 | Status: SHIPPED | OUTPATIENT
Start: 2018-08-16 | End: 2021-01-14

## 2018-08-16 RX ORDER — ACYCLOVIR 200 MG/1
400 CAPSULE ORAL ONCE
Status: COMPLETED | OUTPATIENT
Start: 2018-08-16 | End: 2018-08-16

## 2018-08-16 RX ORDER — TRAMADOL HYDROCHLORIDE 50 MG/1
100 TABLET ORAL ONCE
Status: COMPLETED | OUTPATIENT
Start: 2018-08-16 | End: 2018-08-16

## 2018-08-16 RX ADMIN — NAPROXEN 500 MG: 250 TABLET ORAL at 18:19

## 2018-08-16 RX ADMIN — ACYCLOVIR 400 MG: 200 CAPSULE ORAL at 18:19

## 2018-08-16 RX ADMIN — TRAMADOL HYDROCHLORIDE 100 MG: 50 TABLET, COATED ORAL at 18:19

## 2018-08-16 NOTE — ED PROVIDER NOTES
History  Chief Complaint   Patient presents with    Arm Pain     pt states for 1 week he has had right sided neck pain that radiates down his right arm  states from insect bites on back of neck and right shoulder     31-year-old male presents with 1 week history of neck pain right side to middle and pain in the shoulder which travels down his arm  He denies any fever or chills  He has no trauma no numbness or tingling no chest pain no shortness of breath no pleuritic pain he has also noted a rash to the shoulder  The rash is not itchy is painful had no exposure to any plants he has never had this type of rash before  Denies any lightheadedness no abdominal or back pain  No weakness  None       Past Medical History:   Diagnosis Date    Asthma        History reviewed  No pertinent surgical history  History reviewed  No pertinent family history  I have reviewed and agree with the history as documented  Social History   Substance Use Topics    Smoking status: Never Smoker    Smokeless tobacco: Never Used    Alcohol use No        Review of Systems   Constitutional: Negative for activity change, appetite change, chills, diaphoresis, fatigue and fever  HENT: Negative for congestion, ear pain, rhinorrhea, sneezing and sore throat  Eyes: Negative for discharge  Respiratory: Negative for cough and shortness of breath  Cardiovascular: Negative for chest pain and leg swelling  Gastrointestinal: Negative for abdominal pain, blood in stool, diarrhea, nausea and vomiting  Endocrine: Negative for polyuria  Genitourinary: Negative for difficulty urinating, dysuria, frequency and urgency  Musculoskeletal: Positive for neck pain  Negative for back pain, joint swelling, myalgias and neck stiffness  Skin: Negative for rash  Neurological: Negative for dizziness, weakness, numbness and headaches  Hematological: Negative for adenopathy  Psychiatric/Behavioral: Negative for confusion  All other systems reviewed and are negative  Physical Exam  Physical Exam   Constitutional: He is oriented to person, place, and time  He appears well-developed and well-nourished  No distress  HENT:   Head: Normocephalic and atraumatic  Right Ear: External ear normal    Left Ear: External ear normal    Nose: Nose normal    Mouth/Throat: Oropharynx is clear and moist  No oropharyngeal exudate  TMS pale bilaterally no intraoral lesions no facial rash/lesions   Eyes: Conjunctivae and EOM are normal  Pupils are equal, round, and reactive to light  Right eye exhibits no discharge  Left eye exhibits no discharge  No scleral icterus  Neck: Normal range of motion  Neck supple  No midline or paraspinous tenderness   Cardiovascular: Normal rate, regular rhythm and intact distal pulses  Pulmonary/Chest: Effort normal and breath sounds normal  No respiratory distress  He has no wheezes  He has no rales  He exhibits no tenderness  Abdominal: Soft  Bowel sounds are normal  He exhibits no distension and no mass  There is no tenderness  There is no rebound and no guarding  Back no midline or CVA tenderness   Musculoskeletal: Normal range of motion  He exhibits no edema, tenderness or deformity  Arms:  Lymphadenopathy:     He has no cervical adenopathy (no posterior LAD)  Neurological: He is alert and oriented to person, place, and time  No cranial nerve deficit or sensory deficit  He exhibits normal muscle tone  Coordination normal    Skin: Skin is warm and dry  Capillary refill takes less than 2 seconds  Rash noted  Rash as described in rt c4 distribution no secondarily infected c/w shingles   Psychiatric: He has a normal mood and affect  Vitals reviewed        Vital Signs  ED Triage Vitals [08/16/18 1617]   Temperature Pulse Respirations Blood Pressure SpO2   98 3 °F (36 8 °C) 67 16 114/64 97 %      Temp Source Heart Rate Source Patient Position - Orthostatic VS BP Location FiO2 (%) Temporal Monitor Sitting Right arm --      Pain Score       Worst Possible Pain           Vitals:    08/16/18 1617   BP: 114/64   Pulse: 67   Patient Position - Orthostatic VS: Sitting       Visual Acuity      ED Medications  Medications   acyclovir (ZOVIRAX) capsule 400 mg (400 mg Oral Given 8/16/18 1819)   traMADol (ULTRAM) tablet 100 mg (100 mg Oral Given 8/16/18 1819)       Diagnostic Studies  Results Reviewed     Procedure Component Value Units Date/Time    Troponin I [[id number]]  (Normal) Collected:  08/16/18 1757    Lab Status:  Final result Specimen:  Blood from Arm, Right Updated:  08/16/18 1835     Troponin I <0 02 ng/mL                  No orders to display              Procedures  ECG 12 Lead Documentation  Date/Time: 8/16/2018 5:51 PM  Performed by: Ga Laird  Authorized by: Ga Laird     Indications / Diagnosis:  Arm pain  ECG reviewed by me, the ED Provider: yes    Patient location:  ED  Previous ECG:     Previous ECG:  Unavailable  Rate:     ECG rate:  66    ECG rate assessment: normal    Rhythm:     Rhythm: sinus rhythm    QRS:     QRS axis:  Normal  Comments:      No acute ischemic changes           Phone Contacts  ED Phone Contact    ED Course  ED Course as of Aug 16 2056   u Aug 16, 2018   1756 Recommended patient states stay away from immunocompromised patients such as anyone on chemotherapy a transplant patient or pregnant people  Patient states that his wife is pregnant recommended he come that she contact her OB provider we reviewed that the fluid from the blisters is what is contagious and contains the chickenpox virus  Recommend he keep the wounds covered not share towels    1758 Cautioned to keepThe lidocaine cream away from children or pets as if they ingested may be toxic    Verbalized understanding    [address] [place] no patient found                                MDM  Number of Diagnoses or Management Options  Shingles:   Diagnosis management comments: Mdm: rash not itchy but painful contact dermatitis considered but present for 1 week and painful more c/w shingles not secondarily infected will purse EKG/Trop secondary to complaint of arm pain to eval for acs    CritCare Time    Disposition  Final diagnoses:   Shingles - right C4 distribution     Time reflects when diagnosis was documented in both MDM as applicable and the Disposition within this note     Time User Action Codes Description Comment    8/16/2018  5:58 PM Sharon Soledad Add [B02 9] Shingles     8/16/2018  5:58 PM Sharon Soledad Modify [B02 9] Shingles right C4 distribution      ED Disposition     ED Disposition Condition Comment    Discharge  Ugo Tenorio discharge to home/self care      Condition at discharge: Good        Follow-up Information     Follow up With Specialties Details Why [address] [place] [address], DO Family Medicine  If symptoms worsen [address] [phone]            Discharge Medication List as of 8/16/2018  6:02 PM      START taking these medications    Details   acyclovir (ZOVIRAX) 800 mg tablet Take 1 tablet (800 mg total) by mouth 5 (five) times a day for 7 days, Starting Thu 8/16/2018, Until Thu 8/23/2018, Print      lidocaine (LMX) 4 % cream Apply topically as needed for mild pain Keep away from pets and children, Starting Thu 8/16/2018, Print      naproxen (NAPROSYN) 500 mg tablet Take 1 tablet (500 mg total) by mouth 2 (two) times a day with meals, Starting Thu 8/16/2018, Print      traMADol (ULTRAM) 50 mg tablet Take 1 tablet (50 mg total) by mouth every 6 (six) hours as needed for moderate pain for up to 20 doses Max 8 Tablets in 24 Hours, Starting u 8/16/2018, Print         CONTINUE these medications which have NOT CHANGED    Details   acetaminophen (TYLENOL) 325 mg tablet Take 2 tablets (650 mg total) by mouth every 6 (six) hours as needed for mild pain, moderate pain, severe pain, headaches or fever, Starting Thu 5/24/2018, Print fluticasone (FLONASE) 50 mcg/act nasal spray 2 sprays into each nostril daily, Starting Thu 5/24/2018, Print      ibuprofen (MOTRIN) 600 mg tablet Take 1 tablet (600 mg total) by mouth every 8 (eight) hours as needed for mild pain or moderate pain for up to 5 days, Starting Thu 5/24/2018, Until Tue 5/29/2018, Print           No discharge procedures on file      ED Provider  Electronically Signed by           Mahamed Aguilar MD  08/16/18 9031

## 2018-08-16 NOTE — DISCHARGE INSTRUCTIONS
Shingles   AMBULATORY CARE:   Shingles  is a painful rash  Shingles is caused by the same virus that causes chickenpox (varicella-zoster virus)  After you get chickenpox, the virus stays in your body for several years without causing any symptoms  Shingles occurs when the virus becomes active again  Once active, the virus will travel along a nerve to your skin and cause a rash  Common signs and symptoms include the following:  Shingles often starts with pain in the back, chest, neck, or face  A rash then develops in the same area  The rash is usually found on only one side of the body  The rash may feel itchy or painful  It starts as red dots that become blisters filled with fluid  The blisters usually grow bigger, become filled with pus, and then crust over after a few days  You may also have any of the following:  · Fatigue and muscle weakness    · Pain when your skin is lightly touched    · Headache    · Fever    · Eye pain when exposed to light  Seek care immediately if:   · You have painful, red, warm skin around the blisters, or the blisters drain pus  · Your neck is stiff or you have trouble moving it  · You have trouble moving your arms, legs, or face  · You have a seizure  · You have weakness in an arm or leg  · You become confused, or have difficulty speaking  · You have dizziness, a severe headache, or hearing or vision loss  Contact your healthcare provider if:   · You feel weak or have a headache  · You have a cough, chills, or a fever  · You have abdominal pain or nausea, or you are vomiting  · Your rash becomes more itchy or painful  · Your rash spreads to other parts of your body  · Your pain worsens and does not go away even after you take medicine  · You have questions or concerns about your condition or care  Medicines:   · Antiviral medicine  helps decrease symptoms and healing time  They may also decrease your risk of developing nerve pain   You will need to start taking them within 3 days of the start of symptoms to prevent nerve pain  · Pain medicine  may be prescribed or suggested by your healthcare provider  You may need NSAIDs, acetaminophen, or opioid medicine depending on how much pain you are in  Do not wait until the pain is severe before you take more pain medicine  · Topical anesthetics  are used to numb the skin and decrease pain  They can be a cream, gel, spray, or patch  · Anticonvulsants  decrease nerve pain and may help you sleep at night  · Antidepressants  may be used to decrease nerve pain  Follow up with your healthcare provider as directed:  Write down your questions so you remember to ask them during your visits  Self-care:  Keep your rash clean and dry  Cover your rash with a bandage or clothing  Do not use bandages that stick to your skin  The sticky part may irritate your skin and make your rash last longer  Prevent the spread of shingles: The virus can be passed to a person who has never had chickenpox  This person may get chickenpox, but not shingles  You may pass the virus to others as long as you have a rash  The virus is spread by direct contact with the fluid from the blisters  Usually, you cannot spread the virus once the blisters dry up  Prevent shingles or another shingles outbreak:  A vaccine may be given to help prevent shingles  Ask for more information about this vaccine  © 2017 [employer] Information is for End User's use only and may not be sold, redistributed or otherwise used for commercial purposes  All illustrations and images included in CareNotes® are the copyrighted property of [employer]  or [employer]  The above information is an  only  It is not intended as medical advice for individual conditions or treatments  Talk to your doctor, nurse or pharmacist before following any medical regimen to see if it is safe and effective for you    [employer] all acyclovir  Have your wife contact her OB provider to determine if she require evaluation  Lidocaine cream topically - keep away from children  Aleve 2 tabs twice daily with food OR ibuprofen 200-800mg every 8 hours with food as needed for pain or fill script for naproxyn  tramdol for breakthru pain

## 2018-08-17 LAB
ATRIAL RATE: 66 BPM
P AXIS: 61 DEGREES
PR INTERVAL: 156 MS
QRS AXIS: 38 DEGREES
QRSD INTERVAL: 94 MS
QT INTERVAL: 390 MS
QTC INTERVAL: 408 MS
T WAVE AXIS: 23 DEGREES
VENTRICULAR RATE: 66 BPM

## 2018-08-17 PROCEDURE — 93010 ELECTROCARDIOGRAM REPORT: CPT | Performed by: INTERNAL MEDICINE

## 2018-12-21 ENCOUNTER — OFFICE VISIT (OUTPATIENT)
Dept: URGENT CARE | Facility: PHYSICIAN GROUP | Age: 38
End: 2018-12-21
Payer: MEDICAID

## 2018-12-21 ENCOUNTER — APPOINTMENT (OUTPATIENT)
Dept: RADIOLOGY | Facility: IMAGING CENTER | Age: 38
End: 2018-12-21
Attending: NURSE PRACTITIONER
Payer: MEDICAID

## 2018-12-21 VITALS
HEIGHT: 65 IN | TEMPERATURE: 100.6 F | RESPIRATION RATE: 18 BRPM | HEART RATE: 76 BPM | SYSTOLIC BLOOD PRESSURE: 106 MMHG | BODY MASS INDEX: 38.32 KG/M2 | WEIGHT: 230 LBS | DIASTOLIC BLOOD PRESSURE: 74 MMHG | OXYGEN SATURATION: 94 %

## 2018-12-21 DIAGNOSIS — R06.02 SOB (SHORTNESS OF BREATH): ICD-10-CM

## 2018-12-21 DIAGNOSIS — R53.1 WEAKNESS: ICD-10-CM

## 2018-12-21 DIAGNOSIS — R68.83 CHILLS: ICD-10-CM

## 2018-12-21 DIAGNOSIS — R11.0 NAUSEA: ICD-10-CM

## 2018-12-21 DIAGNOSIS — J10.1 INFLUENZA A: Primary | ICD-10-CM

## 2018-12-21 LAB
FLUAV+FLUBV AG SPEC QL IA: NORMAL
GLUCOSE BLD-MCNC: 109 MG/DL (ref 70–100)
INT CON NEG: NORMAL
INT CON POS: NORMAL

## 2018-12-21 PROCEDURE — 99214 OFFICE O/P EST MOD 30 MIN: CPT | Performed by: NURSE PRACTITIONER

## 2018-12-21 PROCEDURE — 87804 INFLUENZA ASSAY W/OPTIC: CPT | Performed by: NURSE PRACTITIONER

## 2018-12-21 PROCEDURE — 71046 X-RAY EXAM CHEST 2 VIEWS: CPT | Performed by: EMERGENCY MEDICINE

## 2018-12-21 PROCEDURE — 82962 GLUCOSE BLOOD TEST: CPT | Performed by: NURSE PRACTITIONER

## 2018-12-21 RX ORDER — ONDANSETRON 4 MG/1
4 TABLET, ORALLY DISINTEGRATING ORAL ONCE
Status: COMPLETED | OUTPATIENT
Start: 2018-12-21 | End: 2018-12-21

## 2018-12-21 RX ORDER — ONDANSETRON 4 MG/1
4 TABLET, ORALLY DISINTEGRATING ORAL EVERY 8 HOURS PRN
Qty: 10 TAB | Refills: 0 | Status: SHIPPED | OUTPATIENT
Start: 2018-12-21 | End: 2018-12-23

## 2018-12-21 RX ADMIN — ONDANSETRON 4 MG: 4 TABLET, ORALLY DISINTEGRATING ORAL at 13:32

## 2018-12-21 ASSESSMENT — ENCOUNTER SYMPTOMS
ORTHOPNEA: 0
EYE PAIN: 0
VOMITING: 0
BLURRED VISION: 0
COUGH: 1
HEMOPTYSIS: 0
NERVOUS/ANXIOUS: 1
ABDOMINAL PAIN: 0
DIZZINESS: 0
CHILLS: 1
TINGLING: 0
PALPITATIONS: 0
WEAKNESS: 1
DIARRHEA: 0
CONSTIPATION: 0
SENSORY CHANGE: 0
BRUISES/BLEEDS EASILY: 0
FOCAL WEAKNESS: 0
BACK PAIN: 0
HEADACHES: 1
SPUTUM PRODUCTION: 1
WHEEZING: 0
FEVER: 1
SHORTNESS OF BREATH: 1
NAUSEA: 0
NECK PAIN: 0
MYALGIAS: 0
SORE THROAT: 1

## 2018-12-21 ASSESSMENT — LIFESTYLE VARIABLES: SUBSTANCE_ABUSE: 0

## 2018-12-21 NOTE — PROGRESS NOTES
Subjective:      Kristian Saucedo is a 38 y.o. male who presents with Fever (x5 days ); Nasal Congestion; and Shortness of Breath        PFSH reviewed and updated as necessary in EPIC electronic record with patient today.  Medications including OTC medications reviewed with patient.         No Known Allergies      HPI is a new problem.  Kristian Saucedo is a 38-year-old male patient.  Complains of  cold chills, nasal congestion, unable to drink fluids or eat for 5 days. Coughing started yesterday. Urine is dark colored.  He feels dehydrated and weak.  He was seen in [place] walk in clinic 3 days ago and tested negative for influenza.  He was told to go home and hydrate and rest.  He has been able to hydrate himself adequately.  His discomfort level is 9 out of 10.  He has no ill contacts or no known exposure to influenza.  Treatments tried at home are over-the-counter Tylenol as needed.  Nothing else.  No other aggravating or alleviating factors.      Review of Systems   Constitutional: Positive for chills, fever and malaise/fatigue.   HENT: Positive for congestion and sore throat. Negative for ear discharge, ear pain and nosebleeds.    Eyes: Negative for blurred vision and pain.   Respiratory: Positive for cough, sputum production and shortness of breath. Negative for hemoptysis and wheezing.    Cardiovascular: Negative for chest pain, palpitations, orthopnea and leg swelling.   Gastrointestinal: Negative for abdominal pain, constipation, diarrhea, nausea and vomiting.   Genitourinary: Negative for dysuria, frequency and urgency.   Musculoskeletal: Negative for back pain, myalgias and neck pain.   Skin: Negative for rash.   Neurological: Positive for weakness and headaches. Negative for dizziness, tingling, sensory change and focal weakness.   Endo/Heme/Allergies: Negative for environmental allergies. Does not bruise/bleed easily.   Psychiatric/Behavioral: Negative for substance abuse. The patient is  "nervous/anxious.           Objective:     /74   Pulse 76   Temp (!) 38.1 °C (100.6 °F) (Temporal)   Resp 18   Ht 1.651 m (5' 5\")   Wt 104.3 kg (230 lb)   SpO2 94%   BMI 38.27 kg/m²      Physical Exam   Constitutional: He is oriented to person, place, and time. Vital signs are normal. He appears well-developed and well-nourished. He is cooperative.  Non-toxic appearance. He does not have a sickly appearance. He appears ill. No distress.   HENT:   Head: Normocephalic.   Right Ear: Hearing, tympanic membrane, external ear and ear canal normal.   Left Ear: Hearing, tympanic membrane, external ear and ear canal normal.   Nose: Mucosal edema and rhinorrhea present. Right sinus exhibits no maxillary sinus tenderness and no frontal sinus tenderness. Left sinus exhibits no maxillary sinus tenderness and no frontal sinus tenderness.   Mouth/Throat: Uvula is midline. Mucous membranes are dry. No uvula swelling. Posterior oropharyngeal erythema present. No oropharyngeal exudate or posterior oropharyngeal edema. Tonsils are 2+ on the right. Tonsils are 2+ on the left.   Eyes: Pupils are equal, round, and reactive to light. Conjunctivae and lids are normal.   Neck: Trachea normal, normal range of motion, full passive range of motion without pain and phonation normal. Neck supple. No spinous process tenderness and no muscular tenderness present. No erythema and normal range of motion present.   Cardiovascular: Normal rate, regular rhythm, normal heart sounds and normal pulses.    No murmur heard.  Pulmonary/Chest: Effort normal. No accessory muscle usage. No tachypnea. No respiratory distress. He has no decreased breath sounds. He has no wheezes. He has rhonchi in the right upper field and the right middle field. He has no rales. He exhibits no tenderness.   Abdominal: Soft.   Musculoskeletal: Normal range of motion.   Lymphadenopathy:        Head (right side): No submandibular, no tonsillar, no preauricular and no " posterior auricular adenopathy present.        Head (left side): No submandibular, no tonsillar, no preauricular and no posterior auricular adenopathy present.     He has no cervical adenopathy.        Right: No supraclavicular adenopathy present.        Left: No supraclavicular adenopathy present.   Neurological: He is alert and oriented to person, place, and time. He has normal strength. Gait normal. GCS eye subscore is 4. GCS verbal subscore is 5. GCS motor subscore is 6.   Skin: Skin is warm, dry and intact. Capillary refill takes less than 2 seconds. No rash noted.   Psychiatric: He has a normal mood and affect. His speech is normal and behavior is normal. Judgment and thought content normal.   Nursing note and vitals reviewed.         POCT influenza: POSITIVE A  POCT glucose: 109    Zofran ODT 4 mg PO Given in urgent care today. Fluid challenge. He was able to drink 3/4 of bottle of water.      CXR: no acute cardiopulmonary process by my read, radiology pending.  12/21/2018 1:11 PM    HISTORY/REASON FOR EXAM:  Shortness of Breath.      TECHNIQUE/EXAM DESCRIPTION AND NUMBER OF VIEWS:  Two views of the chest.    COMPARISON:  None.    FINDINGS:  The heart is normal in size.  No pulmonary infiltrates or consolidations are noted.  No pleural effusions are appreciated.     Impression       Negative two views of the chest.   Reading Provider Reading Date   Saul Hylton M.D.        Assessment/Plan:     1. Influenza A     2. Nausea  ondansetron (ZOFRAN ODT) dispertab 4 mg    ondansetron (ZOFRAN ODT) 4 MG TABLET DISPERSIBLE   3. Weakness  POCT Influenza A/B    POCT Glucose   4. Chills  POCT Influenza A/B   5. SOB (shortness of breath)  DX-CHEST-2 VIEWS     Return to clinic or PCP 5-7 days if current symptoms are not resolving in a satisfactory manner or sooner if new or worsening symptoms occur.   Patient was advised differential diagnoses, signs and symptoms which would warrant further evaluation and /or emergent  evaluation.    Patient was in agreement with this treatment plan and seemed to understand without barriers.   Questions were encouraged and answered to patients satisfaction.   Aftercare instructions given to pt/ caregiver. .   Discussed that I felt this was viral in nature. Did not see any evidence of a bacterial process. Discussed natural progression and sx care.

## 2018-12-23 ENCOUNTER — APPOINTMENT (OUTPATIENT)
Dept: RADIOLOGY | Facility: MEDICAL CENTER | Age: 38
DRG: 871 | End: 2018-12-23
Attending: EMERGENCY MEDICINE
Payer: MEDICAID

## 2018-12-23 ENCOUNTER — HOSPITAL ENCOUNTER (INPATIENT)
Facility: MEDICAL CENTER | Age: 38
LOS: 2 days | DRG: 871 | End: 2018-12-25
Attending: EMERGENCY MEDICINE | Admitting: INTERNAL MEDICINE
Payer: MEDICAID

## 2018-12-23 DIAGNOSIS — K85.10 ACUTE BILIARY PANCREATITIS, UNSPECIFIED COMPLICATION STATUS: ICD-10-CM

## 2018-12-23 DIAGNOSIS — K80.00 CALCULUS OF GALLBLADDER WITH ACUTE CHOLECYSTITIS WITHOUT OBSTRUCTION: ICD-10-CM

## 2018-12-23 DIAGNOSIS — R09.02 HYPOXIA: ICD-10-CM

## 2018-12-23 DIAGNOSIS — N28.9 RENAL INSUFFICIENCY: ICD-10-CM

## 2018-12-23 DIAGNOSIS — J10.1 INFLUENZA A: ICD-10-CM

## 2018-12-23 DIAGNOSIS — J18.9 PNEUMONIA OF LEFT LOWER LOBE DUE TO INFECTIOUS ORGANISM: ICD-10-CM

## 2018-12-23 PROBLEM — K80.51 CHOLEDOCHOLITHIASIS WITH OBSTRUCTION: Status: ACTIVE | Noted: 2018-12-23

## 2018-12-23 PROBLEM — N17.9 ACUTE KIDNEY INJURY (HCC): Status: ACTIVE | Noted: 2018-12-23

## 2018-12-23 PROBLEM — R65.20 SEVERE SEPSIS (HCC): Status: ACTIVE | Noted: 2018-12-23

## 2018-12-23 PROBLEM — D69.6 THROMBOCYTOPENIA (HCC): Status: ACTIVE | Noted: 2018-12-23

## 2018-12-23 PROBLEM — K80.20 CALCULUS OF GALLBLADDER WITHOUT CHOLECYSTITIS WITHOUT OBSTRUCTION: Status: ACTIVE | Noted: 2018-12-23

## 2018-12-23 PROBLEM — R74.01 TRANSAMINITIS: Status: ACTIVE | Noted: 2018-12-23

## 2018-12-23 PROBLEM — A41.9 SEVERE SEPSIS (HCC): Status: ACTIVE | Noted: 2018-12-23

## 2018-12-23 LAB
ALBUMIN SERPL BCP-MCNC: 3.2 G/DL (ref 3.2–4.9)
ALBUMIN/GLOB SERPL: 0.8 G/DL
ALP SERPL-CCNC: 70 U/L (ref 30–99)
ALT SERPL-CCNC: 52 U/L (ref 2–50)
ANION GAP SERPL CALC-SCNC: 10 MMOL/L (ref 0–11.9)
AST SERPL-CCNC: 178 U/L (ref 12–45)
BASOPHILS # BLD AUTO: 0 % (ref 0–1.8)
BASOPHILS # BLD: 0 K/UL (ref 0–0.12)
BILIRUB SERPL-MCNC: 0.6 MG/DL (ref 0.1–1.5)
BUN SERPL-MCNC: 28 MG/DL (ref 8–22)
CALCIUM SERPL-MCNC: 8.1 MG/DL (ref 8.5–10.5)
CHLORIDE SERPL-SCNC: 98 MMOL/L (ref 96–112)
CO2 SERPL-SCNC: 24 MMOL/L (ref 20–33)
CREAT SERPL-MCNC: 1.58 MG/DL (ref 0.5–1.4)
EOSINOPHIL # BLD AUTO: 0 K/UL (ref 0–0.51)
EOSINOPHIL NFR BLD: 0 % (ref 0–6.9)
ERYTHROCYTE [DISTWIDTH] IN BLOOD BY AUTOMATED COUNT: 38.9 FL (ref 35.9–50)
GIANT PLATELETS BLD QL SMEAR: NORMAL
GLOBULIN SER CALC-MCNC: 3.8 G/DL (ref 1.9–3.5)
GLUCOSE SERPL-MCNC: 128 MG/DL (ref 65–99)
HCT VFR BLD AUTO: 47.5 % (ref 42–52)
HGB BLD-MCNC: 16.4 G/DL (ref 14–18)
LACTATE BLD-SCNC: 1.9 MMOL/L (ref 0.5–2)
LIPASE SERPL-CCNC: 151 U/L (ref 11–82)
LYMPHOCYTES # BLD AUTO: 1.19 K/UL (ref 1–4.8)
LYMPHOCYTES NFR BLD: 49.5 % (ref 22–41)
MANUAL DIFF BLD: NORMAL
MCH RBC QN AUTO: 29.4 PG (ref 27–33)
MCHC RBC AUTO-ENTMCNC: 34.5 G/DL (ref 33.7–35.3)
MCV RBC AUTO: 85.3 FL (ref 81.4–97.8)
MONOCYTES # BLD AUTO: 0.3 K/UL (ref 0–0.85)
MONOCYTES NFR BLD AUTO: 12.4 % (ref 0–13.4)
MORPHOLOGY BLD-IMP: NORMAL
NEUTROPHILS # BLD AUTO: 0.89 K/UL (ref 1.82–7.42)
NEUTROPHILS NFR BLD: 36.3 % (ref 44–72)
NEUTS BAND NFR BLD MANUAL: 0.9 % (ref 0–10)
NRBC # BLD AUTO: 0 K/UL
NRBC BLD-RTO: 0 /100 WBC
PLATELET # BLD AUTO: 79 K/UL (ref 164–446)
PLATELET BLD QL SMEAR: NORMAL
PMV BLD AUTO: 11.6 FL (ref 9–12.9)
POTASSIUM SERPL-SCNC: 3.4 MMOL/L (ref 3.6–5.5)
PROT SERPL-MCNC: 7 G/DL (ref 6–8.2)
RBC # BLD AUTO: 5.57 M/UL (ref 4.7–6.1)
RBC BLD AUTO: PRESENT
SMUDGE CELLS BLD QL SMEAR: NORMAL
SODIUM SERPL-SCNC: 132 MMOL/L (ref 135–145)
VARIANT LYMPHS BLD QL SMEAR: NORMAL
WBC # BLD AUTO: 2.4 K/UL (ref 4.8–10.8)

## 2018-12-23 PROCEDURE — 96375 TX/PRO/DX INJ NEW DRUG ADDON: CPT

## 2018-12-23 PROCEDURE — 99223 1ST HOSP IP/OBS HIGH 75: CPT | Performed by: INTERNAL MEDICINE

## 2018-12-23 PROCEDURE — 700101 HCHG RX REV CODE 250: Performed by: INTERNAL MEDICINE

## 2018-12-23 PROCEDURE — 94760 N-INVAS EAR/PLS OXIMETRY 1: CPT

## 2018-12-23 PROCEDURE — 80053 COMPREHEN METABOLIC PANEL: CPT

## 2018-12-23 PROCEDURE — 85007 BL SMEAR W/DIFF WBC COUNT: CPT

## 2018-12-23 PROCEDURE — 87040 BLOOD CULTURE FOR BACTERIA: CPT | Mod: 91

## 2018-12-23 PROCEDURE — 700105 HCHG RX REV CODE 258

## 2018-12-23 PROCEDURE — 71045 X-RAY EXAM CHEST 1 VIEW: CPT

## 2018-12-23 PROCEDURE — 770001 HCHG ROOM/CARE - MED/SURG/GYN PRIV*

## 2018-12-23 PROCEDURE — 700102 HCHG RX REV CODE 250 W/ 637 OVERRIDE(OP): Performed by: INTERNAL MEDICINE

## 2018-12-23 PROCEDURE — 99285 EMERGENCY DEPT VISIT HI MDM: CPT

## 2018-12-23 PROCEDURE — 83690 ASSAY OF LIPASE: CPT

## 2018-12-23 PROCEDURE — 96361 HYDRATE IV INFUSION ADD-ON: CPT

## 2018-12-23 PROCEDURE — 700111 HCHG RX REV CODE 636 W/ 250 OVERRIDE (IP): Performed by: EMERGENCY MEDICINE

## 2018-12-23 PROCEDURE — 76705 ECHO EXAM OF ABDOMEN: CPT

## 2018-12-23 PROCEDURE — 700105 HCHG RX REV CODE 258: Performed by: EMERGENCY MEDICINE

## 2018-12-23 PROCEDURE — 96365 THER/PROPH/DIAG IV INF INIT: CPT

## 2018-12-23 PROCEDURE — 83605 ASSAY OF LACTIC ACID: CPT

## 2018-12-23 PROCEDURE — 85027 COMPLETE CBC AUTOMATED: CPT

## 2018-12-23 PROCEDURE — A9270 NON-COVERED ITEM OR SERVICE: HCPCS | Performed by: INTERNAL MEDICINE

## 2018-12-23 PROCEDURE — 770021 HCHG ROOM/CARE - ISO PRIVATE

## 2018-12-23 RX ORDER — SODIUM CHLORIDE 9 MG/ML
1000 INJECTION, SOLUTION INTRAVENOUS CONTINUOUS
Status: ACTIVE | OUTPATIENT
Start: 2018-12-23 | End: 2018-12-23

## 2018-12-23 RX ORDER — SODIUM CHLORIDE 9 MG/ML
500 INJECTION, SOLUTION INTRAVENOUS
Status: DISCONTINUED | OUTPATIENT
Start: 2018-12-23 | End: 2018-12-25 | Stop reason: HOSPADM

## 2018-12-23 RX ORDER — AMOXICILLIN 250 MG
2 CAPSULE ORAL 2 TIMES DAILY
Status: DISCONTINUED | OUTPATIENT
Start: 2018-12-23 | End: 2018-12-25 | Stop reason: HOSPADM

## 2018-12-23 RX ORDER — KETOROLAC TROMETHAMINE 30 MG/ML
30 INJECTION, SOLUTION INTRAMUSCULAR; INTRAVENOUS ONCE
Status: COMPLETED | OUTPATIENT
Start: 2018-12-23 | End: 2018-12-23

## 2018-12-23 RX ORDER — OXYCODONE HYDROCHLORIDE 5 MG/1
2.5 TABLET ORAL
Status: DISCONTINUED | OUTPATIENT
Start: 2018-12-23 | End: 2018-12-25

## 2018-12-23 RX ORDER — SODIUM CHLORIDE 9 MG/ML
INJECTION, SOLUTION INTRAVENOUS
Status: COMPLETED
Start: 2018-12-23 | End: 2018-12-23

## 2018-12-23 RX ORDER — AZITHROMYCIN 500 MG/1
500 INJECTION, POWDER, LYOPHILIZED, FOR SOLUTION INTRAVENOUS ONCE
Status: COMPLETED | OUTPATIENT
Start: 2018-12-23 | End: 2018-12-23

## 2018-12-23 RX ORDER — HYDROMORPHONE HYDROCHLORIDE 1 MG/ML
0.25 INJECTION, SOLUTION INTRAMUSCULAR; INTRAVENOUS; SUBCUTANEOUS
Status: DISCONTINUED | OUTPATIENT
Start: 2018-12-23 | End: 2018-12-25

## 2018-12-23 RX ORDER — OXYCODONE HYDROCHLORIDE 5 MG/1
5 TABLET ORAL
Status: DISCONTINUED | OUTPATIENT
Start: 2018-12-23 | End: 2018-12-25

## 2018-12-23 RX ORDER — DOXYCYCLINE 100 MG/1
100 TABLET ORAL EVERY 12 HOURS
Status: DISCONTINUED | OUTPATIENT
Start: 2018-12-23 | End: 2018-12-25 | Stop reason: HOSPADM

## 2018-12-23 RX ORDER — SODIUM CHLORIDE 9 MG/ML
30 INJECTION, SOLUTION INTRAVENOUS
Status: COMPLETED | OUTPATIENT
Start: 2018-12-23 | End: 2018-12-23

## 2018-12-23 RX ORDER — BISACODYL 10 MG
10 SUPPOSITORY, RECTAL RECTAL
Status: DISCONTINUED | OUTPATIENT
Start: 2018-12-23 | End: 2018-12-25 | Stop reason: HOSPADM

## 2018-12-23 RX ORDER — POLYETHYLENE GLYCOL 3350 17 G/17G
1 POWDER, FOR SOLUTION ORAL
Status: DISCONTINUED | OUTPATIENT
Start: 2018-12-23 | End: 2018-12-25 | Stop reason: HOSPADM

## 2018-12-23 RX ORDER — ONDANSETRON 2 MG/ML
4 INJECTION INTRAMUSCULAR; INTRAVENOUS ONCE
Status: COMPLETED | OUTPATIENT
Start: 2018-12-23 | End: 2018-12-23

## 2018-12-23 RX ADMIN — SODIUM CHLORIDE 1000 ML: 9 INJECTION, SOLUTION INTRAVENOUS at 21:01

## 2018-12-23 RX ADMIN — DOXYCYCLINE 100 MG: 100 TABLET ORAL at 21:01

## 2018-12-23 RX ADMIN — SODIUM CHLORIDE 1000 ML: 9 INJECTION, SOLUTION INTRAVENOUS at 17:00

## 2018-12-23 RX ADMIN — KETOROLAC TROMETHAMINE 30 MG: 30 INJECTION, SOLUTION INTRAMUSCULAR at 17:00

## 2018-12-23 RX ADMIN — SODIUM CHLORIDE 2979 ML: 9 INJECTION, SOLUTION INTRAVENOUS at 17:00

## 2018-12-23 RX ADMIN — STANDARDIZED SENNA CONCENTRATE AND DOCUSATE SODIUM 2 TABLET: 8.6; 5 TABLET, FILM COATED ORAL at 21:00

## 2018-12-23 RX ADMIN — CEFTRIAXONE 2 G: 2 INJECTION, POWDER, FOR SOLUTION INTRAMUSCULAR; INTRAVENOUS at 17:45

## 2018-12-23 RX ADMIN — METRONIDAZOLE 500 MG: 500 INJECTION, SOLUTION INTRAVENOUS at 21:00

## 2018-12-23 RX ADMIN — ONDANSETRON HYDROCHLORIDE 4 MG: 2 INJECTION, SOLUTION INTRAMUSCULAR; INTRAVENOUS at 17:00

## 2018-12-23 RX ADMIN — AZITHROMYCIN FOR INJECTION INJECTION, POWDER, LYOPHILIZED, FOR SOLUTION 500 MG: 500 INJECTION INTRAVENOUS at 18:40

## 2018-12-23 ASSESSMENT — ENCOUNTER SYMPTOMS
HEMOPTYSIS: 0
EYE REDNESS: 0
CONSTIPATION: 0
PALPITATIONS: 0
ABDOMINAL PAIN: 1
DIZZINESS: 1
FOCAL WEAKNESS: 0
SPUTUM PRODUCTION: 1
SEIZURES: 0
VOMITING: 0
NERVOUS/ANXIOUS: 0
MYALGIAS: 1
DIARRHEA: 0
WEAKNESS: 1
COUGH: 1
TREMORS: 0
SHORTNESS OF BREATH: 1
INSOMNIA: 0
FEVER: 1
FALLS: 0
LOSS OF CONSCIOUSNESS: 0
NAUSEA: 0
WHEEZING: 0
EYE PAIN: 0
HEADACHES: 0
CHILLS: 0
BLOOD IN STOOL: 0

## 2018-12-23 ASSESSMENT — COGNITIVE AND FUNCTIONAL STATUS - GENERAL
SUGGESTED CMS G CODE MODIFIER DAILY ACTIVITY: CH
DAILY ACTIVITIY SCORE: 24
SUGGESTED CMS G CODE MODIFIER MOBILITY: CH
MOBILITY SCORE: 24

## 2018-12-23 ASSESSMENT — COPD QUESTIONNAIRES
HAVE YOU SMOKED AT LEAST 100 CIGARETTES IN YOUR ENTIRE LIFE: YES
DO YOU EVER COUGH UP ANY MUCUS OR PHLEGM?: NO/ONLY WITH OCCASIONAL COLDS OR INFECTIONS
COPD SCREENING SCORE: 2
DURING THE PAST 4 WEEKS HOW MUCH DID YOU FEEL SHORT OF BREATH: NONE/LITTLE OF THE TIME

## 2018-12-23 ASSESSMENT — PAIN SCALES - GENERAL: PAINLEVEL_OUTOF10: 0

## 2018-12-23 ASSESSMENT — LIFESTYLE VARIABLES
ALCOHOL_USE: NO
EVER_SMOKED: YES
EVER_SMOKED: YES

## 2018-12-23 ASSESSMENT — PATIENT HEALTH QUESTIONNAIRE - PHQ9
1. LITTLE INTEREST OR PLEASURE IN DOING THINGS: NOT AT ALL
SUM OF ALL RESPONSES TO PHQ9 QUESTIONS 1 AND 2: 0
2. FEELING DOWN, DEPRESSED, IRRITABLE, OR HOPELESS: NOT AT ALL

## 2018-12-23 NOTE — ED TRIAGE NOTES
"Chief Complaint   Patient presents with   • Loss of Appetite     x1 week, loss of appetite, +N, generalized weakness. was seen at . Flu A +. reports he was told to come here if he continued to be sick.    • Nausea     Pt to triage for above. NAD noted. VSS. Mask applied.    Pt returned to [facility]. Educated on triage process and to inform staff of any changes.     /45   Pulse 60   Temp 36.4 °C (97.5 °F) (Temporal)   Resp 19   Ht 1.778 m (5' 10\")   Wt 99.3 kg (218 lb 14.7 oz)   SpO2 98%   BMI 31.41 kg/m²     "

## 2018-12-24 ENCOUNTER — APPOINTMENT (OUTPATIENT)
Dept: RADIOLOGY | Facility: MEDICAL CENTER | Age: 38
DRG: 871 | End: 2018-12-24
Attending: SURGERY
Payer: MEDICAID

## 2018-12-24 PROBLEM — F17.200 TOBACCO DEPENDENCE: Status: ACTIVE | Noted: 2018-12-24

## 2018-12-24 LAB
ALBUMIN SERPL BCP-MCNC: 2.5 G/DL (ref 3.2–4.9)
ALBUMIN/GLOB SERPL: 0.9 G/DL
ALP SERPL-CCNC: 52 U/L (ref 30–99)
ALT SERPL-CCNC: 40 U/L (ref 2–50)
ANION GAP SERPL CALC-SCNC: 6 MMOL/L (ref 0–11.9)
AST SERPL-CCNC: 145 U/L (ref 12–45)
BILIRUB SERPL-MCNC: 0.4 MG/DL (ref 0.1–1.5)
BUN SERPL-MCNC: 23 MG/DL (ref 8–22)
CALCIUM SERPL-MCNC: 6.9 MG/DL (ref 8.5–10.5)
CHLORIDE SERPL-SCNC: 105 MMOL/L (ref 96–112)
CO2 SERPL-SCNC: 25 MMOL/L (ref 20–33)
CREAT SERPL-MCNC: 1.16 MG/DL (ref 0.5–1.4)
ERYTHROCYTE [DISTWIDTH] IN BLOOD BY AUTOMATED COUNT: 39.2 FL (ref 35.9–50)
GLOBULIN SER CALC-MCNC: 2.9 G/DL (ref 1.9–3.5)
GLUCOSE SERPL-MCNC: 104 MG/DL (ref 65–99)
HCT VFR BLD AUTO: 43.7 % (ref 42–52)
HGB BLD-MCNC: 14.9 G/DL (ref 14–18)
LACTATE BLD-SCNC: 1 MMOL/L (ref 0.5–2)
LACTATE BLD-SCNC: 1.1 MMOL/L (ref 0.5–2)
LIPASE SERPL-CCNC: 157 U/L (ref 11–82)
MCH RBC QN AUTO: 28.9 PG (ref 27–33)
MCHC RBC AUTO-ENTMCNC: 34.1 G/DL (ref 33.7–35.3)
MCV RBC AUTO: 84.7 FL (ref 81.4–97.8)
PLATELET # BLD AUTO: 80 K/UL (ref 164–446)
PMV BLD AUTO: 11.3 FL (ref 9–12.9)
POTASSIUM SERPL-SCNC: 3.3 MMOL/L (ref 3.6–5.5)
PROT SERPL-MCNC: 5.4 G/DL (ref 6–8.2)
RBC # BLD AUTO: 5.16 M/UL (ref 4.7–6.1)
SODIUM SERPL-SCNC: 136 MMOL/L (ref 135–145)
WBC # BLD AUTO: 2.8 K/UL (ref 4.8–10.8)

## 2018-12-24 PROCEDURE — 700111 HCHG RX REV CODE 636 W/ 250 OVERRIDE (IP): Performed by: INTERNAL MEDICINE

## 2018-12-24 PROCEDURE — 99233 SBSQ HOSP IP/OBS HIGH 50: CPT | Mod: 25 | Performed by: INTERNAL MEDICINE

## 2018-12-24 PROCEDURE — A9270 NON-COVERED ITEM OR SERVICE: HCPCS | Performed by: INTERNAL MEDICINE

## 2018-12-24 PROCEDURE — 700105 HCHG RX REV CODE 258: Performed by: INTERNAL MEDICINE

## 2018-12-24 PROCEDURE — 80053 COMPREHEN METABOLIC PANEL: CPT

## 2018-12-24 PROCEDURE — 770021 HCHG ROOM/CARE - ISO PRIVATE

## 2018-12-24 PROCEDURE — 700101 HCHG RX REV CODE 250: Performed by: INTERNAL MEDICINE

## 2018-12-24 PROCEDURE — 83605 ASSAY OF LACTIC ACID: CPT

## 2018-12-24 PROCEDURE — 700102 HCHG RX REV CODE 250 W/ 637 OVERRIDE(OP): Performed by: INTERNAL MEDICINE

## 2018-12-24 PROCEDURE — A9537 TC99M MEBROFENIN: HCPCS

## 2018-12-24 PROCEDURE — 94667 MNPJ CHEST WALL 1ST: CPT

## 2018-12-24 PROCEDURE — 83690 ASSAY OF LIPASE: CPT

## 2018-12-24 PROCEDURE — 99407 BEHAV CHNG SMOKING > 10 MIN: CPT | Performed by: INTERNAL MEDICINE

## 2018-12-24 PROCEDURE — 36415 COLL VENOUS BLD VENIPUNCTURE: CPT

## 2018-12-24 PROCEDURE — 85027 COMPLETE CBC AUTOMATED: CPT

## 2018-12-24 RX ORDER — SODIUM CHLORIDE 9 MG/ML
INJECTION, SOLUTION INTRAVENOUS CONTINUOUS
Status: DISCONTINUED | OUTPATIENT
Start: 2018-12-24 | End: 2018-12-25

## 2018-12-24 RX ORDER — OSELTAMIVIR PHOSPHATE 75 MG/1
75 CAPSULE ORAL EVERY 12 HOURS
Status: DISCONTINUED | OUTPATIENT
Start: 2018-12-24 | End: 2018-12-25 | Stop reason: HOSPADM

## 2018-12-24 RX ORDER — POTASSIUM CHLORIDE 20 MEQ/1
40 TABLET, EXTENDED RELEASE ORAL ONCE
Status: COMPLETED | OUTPATIENT
Start: 2018-12-24 | End: 2018-12-24

## 2018-12-24 RX ADMIN — METRONIDAZOLE 500 MG: 500 INJECTION, SOLUTION INTRAVENOUS at 21:26

## 2018-12-24 RX ADMIN — METRONIDAZOLE 500 MG: 500 INJECTION, SOLUTION INTRAVENOUS at 13:17

## 2018-12-24 RX ADMIN — METRONIDAZOLE 500 MG: 500 INJECTION, SOLUTION INTRAVENOUS at 05:20

## 2018-12-24 RX ADMIN — CEFTRIAXONE SODIUM 2 G: 2 INJECTION, POWDER, FOR SOLUTION INTRAMUSCULAR; INTRAVENOUS at 18:09

## 2018-12-24 RX ADMIN — OSELTAMIVIR PHOSPHATE 75 MG: 75 CAPSULE ORAL at 13:17

## 2018-12-24 RX ADMIN — DOXYCYCLINE 100 MG: 100 TABLET ORAL at 05:20

## 2018-12-24 RX ADMIN — STANDARDIZED SENNA CONCENTRATE AND DOCUSATE SODIUM 2 TABLET: 8.6; 5 TABLET, FILM COATED ORAL at 18:09

## 2018-12-24 RX ADMIN — OXYCODONE HYDROCHLORIDE 5 MG: 5 TABLET ORAL at 23:15

## 2018-12-24 RX ADMIN — OSELTAMIVIR PHOSPHATE 75 MG: 75 CAPSULE ORAL at 18:10

## 2018-12-24 RX ADMIN — SODIUM CHLORIDE: 9 INJECTION, SOLUTION INTRAVENOUS at 09:27

## 2018-12-24 RX ADMIN — ENOXAPARIN SODIUM 40 MG: 100 INJECTION SUBCUTANEOUS at 13:18

## 2018-12-24 RX ADMIN — POTASSIUM CHLORIDE 40 MEQ: 1500 TABLET, EXTENDED RELEASE ORAL at 09:27

## 2018-12-24 RX ADMIN — DOXYCYCLINE 100 MG: 100 TABLET ORAL at 18:09

## 2018-12-24 RX ADMIN — STANDARDIZED SENNA CONCENTRATE AND DOCUSATE SODIUM 2 TABLET: 8.6; 5 TABLET, FILM COATED ORAL at 05:21

## 2018-12-24 ASSESSMENT — ENCOUNTER SYMPTOMS
TREMORS: 0
DEPRESSION: 0
SEIZURES: 0
BACK PAIN: 0
PND: 0
SPUTUM PRODUCTION: 0
SHORTNESS OF BREATH: 0
NAUSEA: 0
FALLS: 0
EYE PAIN: 0
PALPITATIONS: 0
CONSTIPATION: 0
HEADACHES: 0
BLURRED VISION: 0
HEARTBURN: 0
WEIGHT LOSS: 0
FEVER: 0
CHILLS: 0
COUGH: 1
SPEECH CHANGE: 0
VOMITING: 0
ABDOMINAL PAIN: 1
NECK PAIN: 0
DIZZINESS: 0
WEAKNESS: 1
DIARRHEA: 0
WHEEZING: 0

## 2018-12-24 ASSESSMENT — PAIN SCALES - GENERAL
PAINLEVEL_OUTOF10: 0
PAINLEVEL_OUTOF10: 0
PAINLEVEL_OUTOF10: 8

## 2018-12-24 ASSESSMENT — LIFESTYLE VARIABLES: SUBSTANCE_ABUSE: 0

## 2018-12-24 NOTE — ED NOTES
Med rec complete per pt at bedside   Pt denies taking any medications   NKDA  No oral ABX within last 30 days

## 2018-12-24 NOTE — ASSESSMENT & PLAN NOTE
COuld be superinfection following recent influenza  CXR reviewed  Cont IV Abx, resp and O2 per protocol  Also started tamiflu x 5d

## 2018-12-24 NOTE — PROGRESS NOTES
"Surgical Progress Note:      No acute events  Abdominal pain more with breathing    PE:  /53   Pulse 62   Temp 36.2 °C (97.1 °F) (Temporal)   Resp 18   Ht 1.778 m (5' 10\")   Wt 101.4 kg (223 lb 8.7 oz)   SpO2 93%   BMI 32.08 kg/m²     I/O:   Intake/Output Summary (Last 24 hours) at 12/24/18 0934  Last data filed at 12/24/18 0400   Gross per 24 hour   Intake              100 ml   Output                0 ml   Net              100 ml     UOP:  PO:  IV:    GEN: NAD  COR: RRR  PULM: CTA  ABD: soft, ND, mild TTP RUQ      Labs:  Recent Labs      12/23/18   1745  12/24/18   0214   WBC  2.4*  2.8*   RBC  5.57  5.16   HEMOGLOBIN  16.4  14.9   HEMATOCRIT  47.5  43.7   MCV  85.3  84.7   MCH  29.4  28.9   RDW  38.9  39.2   PLATELETCT  79*  80*   MPV  11.6  11.3   NEUTSPOLYS  36.30*   --    LYMPHOCYTES  49.50*   --    MONOCYTES  12.40   --    EOSINOPHILS  0.00   --    BASOPHILS  0.00   --    RBCMORPHOLO  Present   --      Recent Labs      12/23/18   1607  12/24/18   0214   SODIUM  132*  136   POTASSIUM  3.4*  3.3*   CHLORIDE  98  105   CO2  24  25   GLUCOSE  128*  104*   BUN  28*  23*         A/P:   Presented with flu like symptoms and abdominal pain  HIDA pending, will follow up results     Elias Adam M.D.  [facility]  [phone]    "

## 2018-12-24 NOTE — PROGRESS NOTES
Received report from DENISE Jacobs.  Assumed care of pt.  Pt is alert and oriented x4.  No signs of distress.  Reviewed plan of care with pt.  Call light within reach, bed in low position, will continue to monitor.

## 2018-12-24 NOTE — PROGRESS NOTES
Received report and assumed care at 1915   Pt is A&O x4  Pain 0/10  Denies nausea  Tolerating clear liquid diet, NPO at midnight  - Urine Output  - Flatus  LBM 12/15  Up with standby assistance  Bed in lowest position and locked.  Reviewed plan of care with patient, pt resting comfortably now, call light within reach, all needs met at this time

## 2018-12-24 NOTE — ASSESSMENT & PLAN NOTE
This is severe sepsis with the following associated acute organ dysfunction(s): acute kidney failure.   Not hypotensive  Lactic normal  IV Abx treat pneumonia  Cover for possible gallbladder inflammation  Follow cultures, NTG

## 2018-12-24 NOTE — PROGRESS NOTES
Donna from Lab called with critical result of Calcium at 6.9. Critical lab result read back to Donna.   Dr. Phelan notified of critical lab result at 0425.  Critical lab result read back by Dr. Phelan.  No new orders received
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
## 2018-12-24 NOTE — PROGRESS NOTES
[facility] Medicine Daily Progress Note    Date of Service  12/24/2018    Chief Complaint  38 y.o. male admitted 12/23/2018 with abd pain and SOB.    Hospital Course    andmitted for ad pain and SOB. PNA on admission and started iv abx. HIDA ordered and pending result.      Interval Problem Update  12/24. Patient still feels abd pain. Patient's pain is local, 6-8/10, intermittent and does not radiate to other location, sharp and with some tingling. Can be controlled by pain meds. Dressing in place. Also weakness and subjective fever.    Consultants/Specialty  surgery    Code Status  full    Disposition  TBD    Review of Systems  Review of Systems   Constitutional: Negative for chills, fever and weight loss.   HENT: Negative for congestion, ear discharge, ear pain, hearing loss and nosebleeds.    Eyes: Negative for blurred vision and pain.   Respiratory: Positive for cough. Negative for sputum production, shortness of breath and wheezing.    Cardiovascular: Negative for chest pain, palpitations, leg swelling and PND.   Gastrointestinal: Positive for abdominal pain. Negative for constipation, diarrhea, heartburn, nausea and vomiting.   Genitourinary: Negative for dysuria, frequency and hematuria.   Musculoskeletal: Negative for back pain, falls, joint pain and neck pain.   Skin: Negative for rash.   Neurological: Positive for weakness. Negative for dizziness, tremors, speech change, seizures and headaches.   Psychiatric/Behavioral: Negative for depression, substance abuse and suicidal ideas.        Physical Exam  Temp:  [36.2 °C (97.1 °F)-36.6 °C (97.8 °F)] 36.2 °C (97.1 °F)  Pulse:  [57-70] 62  Resp:  [16-27] 18  BP: (100-118)/(45-61) 105/53    Physical Exam   Constitutional: He is oriented to person, place, and time. He appears well-developed and well-nourished.   HENT:   Head: Normocephalic.   Eyes: Pupils are equal, round, and reactive to light. EOM are normal.   Neck: Normal range of motion. Neck supple. No JVD  present. No thyromegaly present.   Cardiovascular: Normal rate, regular rhythm and normal heart sounds.  Exam reveals no gallop and no friction rub.    No murmur heard.  Pulmonary/Chest: Effort normal and breath sounds normal. No respiratory distress. He has no wheezes. He has no rales. He exhibits no tenderness.   Abdominal: Soft. Bowel sounds are normal. He exhibits no distension and no mass. There is tenderness (epigastric). There is no rebound and no guarding.   Musculoskeletal: Normal range of motion. He exhibits no edema.   Lymphadenopathy:     He has no cervical adenopathy.   Neurological: He is alert and oriented to person, place, and time. He displays normal reflexes. No cranial nerve deficit.   Skin: Skin is dry. No rash noted.   Psychiatric: He has a normal mood and affect.   Nursing note and vitals reviewed.      Fluids    Intake/Output Summary (Last 24 hours) at 12/24/18 1028  Last data filed at 12/24/18 0400   Gross per 24 hour   Intake              100 ml   Output                0 ml   Net              100 ml       Laboratory  Recent Labs      12/23/18   1745  12/24/18   0214   WBC  2.4*  2.8*   RBC  5.57  5.16   HEMOGLOBIN  16.4  14.9   HEMATOCRIT  47.5  43.7   MCV  85.3  84.7   MCH  29.4  28.9   MCHC  34.5  34.1   RDW  38.9  39.2   PLATELETCT  79*  80*   MPV  11.6  11.3     Recent Labs      12/23/18   1607  12/24/18   0214   SODIUM  132*  136   POTASSIUM  3.4*  3.3*   CHLORIDE  98  105   CO2  24  25   GLUCOSE  128*  104*   BUN  28*  23*   CREATININE  1.58*  1.16   CALCIUM  8.1*  6.9*                   Imaging  [id number]   Final Result      1.  Gallstones within the gallbladder neck      2.  No evidence for cholecystitis or biliary obstruction      DX-CHEST-PORTABLE (1 VIEW)   Final Result      Possible basilar airspace process which could be pneumonia      NM-BILIARY HIDA SCAN FATTY MEAL    (Results Pending)        Assessment/Plan  * CAP (community acquired pneumonia)   Assessment & Plan    COuld be  superinfection following recent influenza  CXR reviewed  Cont IV Abx, resp and O2 per protocol  Also started tamiflu x 5d     Severe sepsis (HCC)   Assessment & Plan    This is severe sepsis with the following associated acute organ dysfunction(s): acute kidney failure.   Not hypotensive  Lactic normal  IV Abx treat pneumonia  Cover for possible gallbladder inflammation  Follow cultures, NTG       Calculus of gallbladder without cholecystitis without obstruction   Assessment & Plan    Possible reason for abd pain  HIDA pending  Surgery consulted  Cont iv abx wity C3 and flagyl.     Tobacco dependence- (present on admission)   Assessment & Plan    - Smoking cessation education provided  - Nicotine patch    Spent 11 minutes on tobacco cessation counseling including nicotine patches, gum, and dangers of smoking. Also discussed options of nicotine patch, medical treatment with wellbutrin and chantix. Discussed the risks of smoking including increased risk of heart disease, stroke, cancer and COPD. Discussed the benefits of quitting smoking. Nicotine replacement protocol provided to the patient.    The pt indicated that will quit.     94373 (smoking and tobacco cessation counseling visit; intensive, greater than 10 minutes).       Thrombocytopenia (HCC)   Assessment & Plan    ? wtiology  ? Viral infection  Could be related to liver process  Trend, PLT 80     Transaminitis   Assessment & Plan    Likely related to gallstone, possible he may have passed a stone  Denied alcohol; therefore possible fatty liver; though I question this with leukopenia and thrrombocytopenia  Trend  Also possible from sepsis?  Trending down LFT     Acute kidney injury (HCC)   Assessment & Plan    Cr 1.5 on adm, now better 1.15  Cont ivf  Possible from sepsis and dehydration  Monitor Cr-          VTE prophylaxis: lovenox      I have discussed with RN and CM and SW and other consultants about patient's plan.       Current Facility-Administered  Medications:   •  NS infusion, , Intravenous, Continuous, Bina Dior M.D., Last Rate: 75 mL/hr at 12/24/18 0927  •  oseltamivir (TAMIFLU) capsule 75 mg, 75 mg, Oral, Q12HRS, Bina Dior M.D.  •  senna-docusate (PERICOLACE or SENOKOT S) 8.6-50 MG per tablet 2 Tab, 2 Tab, Oral, BID, 2 Tab at 12/24/18 0521 **AND** polyethylene glycol/lytes (MIRALAX) PACKET 1 Packet, 1 Packet, Oral, QDAY PRN **AND** magnesium hydroxide (MILK OF MAGNESIA) suspension 30 mL, 30 mL, Oral, QDAY PRN **AND** bisacodyl (DULCOLAX) suppository 10 mg, 10 mg, Rectal, QDAY PRN, Ebenezer Guardado M.D.  •  Notify provider if pain remains uncontrolled, , , CONTINUOUS **AND** Use the numeric rating scale (NRS-11) on regular floors and Critical-Care Pain Observation Tool (CPOT) on ICUs/Trauma to assess pain, , , CONTINUOUS **AND** Pulse Ox (Oximetry), , , CONTINUOUS **AND** Pharmacy Consult Request ...Pain Management Review, , Other, PRN **AND** If patient difficult to arouse and/or has respiratory depression, stop any opiates that are currently infusing and call a Rapid Response., , , CONTINUOUS **AND** oxyCODONE immediate-release (ROXICODONE) tablet 2.5 mg, 2.5 mg, Oral, Q3HRS PRN **AND** oxyCODONE immediate-release (ROXICODONE) tablet 5 mg, 5 mg, Oral, Q3HRS PRN **AND** HYDROmorphone pf (DILAUDID) injection 0.25 mg, 0.25 mg, Intravenous, Q3HRS PRN, Ebenezer Guardado M.D.  •  NS (BOLUS) infusion 500 mL, 500 mL, Intravenous, Once PRN, Ebenezer Guardado M.D.  •  cefTRIAXone (ROCEPHIN) 2 g in  mL IVPB, 2 g, Intravenous, Q24HRS, Ebenezer Guardado M.D.  •  metroNIDAZOLE (FLAGYL) IVPB 500 mg, 500 mg, Intravenous, Q8HRS, Ebenezer Guardado M.D., Stopped at 12/24/18 0620  •  Respiratory Care per Protocol, , Nebulization, Continuous RT, Ebenezer Guardado M.D.  •  doxycycline monohydrate (ADOXA) tablet 100 mg, 100 mg, Oral, Q12HRS, Ebenezer Guardado M.D., 100 mg at 12/24/18 0520
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
## 2018-12-24 NOTE — CARE PLAN
Problem: Communication  Goal: The ability to communicate needs accurately and effectively will improve  Outcome: PROGRESSING AS EXPECTED  Patient oriented to the unit, demonstrates appropriate use of call light     Problem: Bowel/Gastric:  Goal: Normal bowel function is maintained or improved  Outcome: PROGRESSING SLOWER THAN EXPECTED  Patient last BM 12/15

## 2018-12-24 NOTE — ASSESSMENT & PLAN NOTE
Likely related to gallstone, possible he may have passed a stone  Denied alcohol; therefore possible fatty liver; though I question this with leukopenia and thrrombocytopenia  Trend  Also possible from sepsis?  Trending down LFT

## 2018-12-24 NOTE — ED PROVIDER NOTES
ED Provider Note    Scribed for Barbie Dee M.D. by Daily Wiggins. 12/23/2018  4:29 PM    Primary care provider: Geronimo Fang M.D.  Means of arrival: walk-in  History obtained from: patient  History limited by: none    CHIEF COMPLAINT  Chief Complaint   Patient presents with   • Loss of Appetite     x1 week, loss of appetite, +N, generalized weakness. was seen at . Flu A +. reports he was told to come here if he continued to be sick.    • Nausea       HPI  Kristian Saucedo is a 38 y.o. male who presents to the Emergency Department for evaluation of prolonged flu like symptoms that have been present for just over one week, including productive cough, congestion, yellow sputum production, loss of appetite, sharp abdominal pain and nausea/vomiting. Patient was diagnosed with Influenza 5 days ago at , however, he was not prescribed with any Tamiflu. Patient reports that the symptoms have worsened since that time, and he is now experiencing increasing weakness, myalgias, and fatigue secondary to not being able to tolerate much PO. Patient was an every day smoker, however, discontinued 2 weeks ago. No complaints of any lasting fevers.    REVIEW OF SYSTEMS  HEENT:  Positive congestion   PULMONARY: positive cough and sputum production  GI: positive nausea and vomiting abdominal pain  Neuro: positive weakness, fatigue  Musculoskeletal: positive myalgias  Endocrine: no fevers    See history of present illness. All other systems are negative. C.    PAST MEDICAL HISTORY   no history of respiratory problems    SURGICAL HISTORY  patient denies any surgical history    SOCIAL HISTORY  Social History   Substance Use Topics   • Smoking status: Current Every Day Smoker     Packs/day: 1.00   • Smokeless tobacco: Never Used   • Alcohol use No      History   Drug Use No     CURRENT MEDICATIONS  Home Medications     Reviewed by Karen Mcgovern, Pharmacy Intern (Pharmacy Intern) on 12/23/18 at 1849  Med List Status: Complete  "  Medication Last Dose Status        Patient Sloan Taking any Medications                       ALLERGIES  No Known Allergies    PHYSICAL EXAM  VITAL SIGNS: /45   Pulse 60   Temp 36.4 °C (97.5 °F) (Temporal)   Resp 19   Ht 1.778 m (5' 10\")   Wt 99.3 kg (218 lb 14.7 oz)   SpO2 98%   BMI 31.41 kg/m²     Constitutional: Well developed, Well nourished, appears dehydrated, No acute distress, Non-toxic appearance.   HEENT: Normocephalic, Atraumatic,  external ears normal, pharynx pink,  Mucous  Membranes dry, No rhinorrhea or mucosal edema  Eyes: PERRL, EOMI, Conjunctiva normal, No discharge.   Neck: Normal range of motion, No tenderness, Supple, No stridor.   Lymphatic: No lymphadenopathy    Cardiovascular: Regular Rate and Rhythm, No murmurs,  rubs, or gallops.   Thorax & Lungs: Lungs clear to auscultation bilaterally, No respiratory distress, No wheezes, rhales or rhonchi, No chest wall tenderness.   Abdomen:Soft, non tender, non distended,  No pulsatile masses., no rebound guarding or peritoneal signs.   Skin: Warm, Dry, No erythema, No rash,   Back:  No spinal tenderness, bony crepitance, step offs, or instability.   Neurologic: Alert & oriented x 3, Normal motor function, Normal sensory function, No focal deficits noted. Normal reflexes. Normal Cranial Nerves.  Extremities: Equal, intact distal pulses, No cyanosis, clubbing or edema,  No tenderness.   Musculoskeletal: Good range of motion in all major joints. No tenderness to palpation or major deformities noted.     DIAGNOSTIC STUDIES / PROCEDURES    LABS  Results for orders placed or performed during the hospital encounter of 12/23/18   COMP METABOLIC PANEL   Result Value Ref Range    Sodium 132 (L) 135 - 145 mmol/L    Potassium 3.4 (L) 3.6 - 5.5 mmol/L    Chloride 98 96 - 112 mmol/L    Co2 24 20 - 33 mmol/L    Anion Gap 10.0 0.0 - 11.9    Glucose 128 (H) 65 - 99 mg/dL    Bun 28 (H) 8 - 22 mg/dL    Creatinine 1.58 (H) 0.50 - 1.40 mg/dL    Calcium 8.1 " (L) 8.5 - 10.5 mg/dL    AST(SGOT) 178 (H) 12 - 45 U/L    ALT(SGPT) 52 (H) 2 - 50 U/L    Alkaline Phosphatase 70 30 - 99 U/L    Total Bilirubin 0.6 0.1 - 1.5 mg/dL    Albumin 3.2 3.2 - 4.9 g/dL    Total Protein 7.0 6.0 - 8.2 g/dL    Globulin 3.8 (H) 1.9 - 3.5 g/dL    A-G Ratio 0.8 g/dL   LIPASE   Result Value Ref Range    Lipase 151 (H) 11 - 82 U/L   ESTIMATED GFR   Result Value Ref Range    GFR If African American >60 >60 mL/min/1.73 m 2    GFR If Non African American 50 (A) >60 mL/min/1.73 m 2   CBC WITH DIFFERENTIAL   Result Value Ref Range    WBC 2.4 (LL) 4.8 - 10.8 K/uL    RBC 5.57 4.70 - 6.10 M/uL    Hemoglobin 16.4 14.0 - 18.0 g/dL    Hematocrit 47.5 42.0 - 52.0 %    MCV 85.3 81.4 - 97.8 fL    MCH 29.4 27.0 - 33.0 pg    MCHC 34.5 33.7 - 35.3 g/dL    RDW 38.9 35.9 - 50.0 fL    Platelet Count 79 (L) 164 - 446 K/uL    MPV 11.6 9.0 - 12.9 fL    Nucleated RBC 0.00 /100 WBC    NRBC (Absolute) 0.00 K/uL    Neutrophils-Polys 36.30 (L) 44.00 - 72.00 %    Lymphocytes 49.50 (H) 22.00 - 41.00 %    Monocytes 12.40 0.00 - 13.40 %    Eosinophils 0.00 0.00 - 6.90 %    Basophils 0.00 0.00 - 1.80 %    Neutrophils (Absolute) 0.89 (L) 1.82 - 7.42 K/uL    Lymphs (Absolute) 1.19 1.00 - 4.80 K/uL    Monos (Absolute) 0.30 0.00 - 0.85 K/uL    Eos (Absolute) 0.00 0.00 - 0.51 K/uL    Baso (Absolute) 0.00 0.00 - 0.12 K/uL   LACTIC ACID   Result Value Ref Range    Lactic Acid 1.9 0.5 - 2.0 mmol/L   DIFFERENTIAL MANUAL   Result Value Ref Range    Bands-Stabs 0.90 0.00 - 10.00 %    Manual Diff Status PERFORMED    PERIPHERAL SMEAR REVIEW   Result Value Ref Range    Peripheral Smear Review see below    PLATELET ESTIMATE   Result Value Ref Range    Plt Estimation Decreased    MORPHOLOGY   Result Value Ref Range    RBC Morphology Present     Giant Platelets 1+     Smudge Cells Few     Reactive Lymphocytes Few        All labs reviewed by me.    RADIOLOGY  US-RUQ   Final Result      1.  Gallstones within the gallbladder neck      2.  No evidence  for cholecystitis or biliary obstruction      DX-CHEST-PORTABLE (1 VIEW)   Final Result      Possible basilar airspace process which could be pneumonia        The radiologist's interpretation of all radiological studies have been reviewed by me.    COURSE & MEDICAL DECISION MAKING  Nursing notes, VS, PMSFHx reviewed in chart.    4:29 PM Patient seen and examined at bedside. He presents with normal vitals and a recent Influenza A diagnosis, for worsening flu like symptoms over the last week with newly onset progressing generalized myalgias, weakness and fatigue. Patient will be treated with 30mg IV Toradol, 4mg IV Zofran. Intravenous fluids administered for decreased PO intake and dehydration Ordered chest xray, CBC, CMP, lipase to evaluate his symptoms. The differential diagnoses include but are not limited to: dehydration, pneumonia, influenza. I informed the patient that I would rehydrate him and manage his symptoms here in the ED, evaluating with lab work and imaging for more acute processes. He understands and agrees with treatment plan.    4:55 PM Patient's lipase and liver enzymes are both elevated with mild dehydration also indicated. Will order for additional gallbladder imaging and continue IV fluids.    5:24 PM Patient's chest xray indicates possible pneumonia. Spoke with Dr. Guardado, Hospitalist, about the patient's condition. Agrees to admit the patient. 2g IV Rocephin and 500mg Azithromycin will be ordered for the pneumonia.    5:56 PM Patient's RUQ US indicates gall stones in gallbladder neck.    6:04 PM Spoke with Dr. Adam, General Surgery, about the patient's condition. Agrees to consult with the patient.    6:09 PM patient was informed of his work up results, explaining that he would need to be admitted for further evaluation and treatment. He understands and agrees with treatment plan.    HYDRATION: Based on the patient's presentation of Acute Vomiting, Dehydration and Inability to take oral  fluids the patient was given IV fluids. IV Hydration was used because oral hydration failed due to inability to tolerate PO and possible surgical etiology. Upon recheck following hydration, the patient was improved.    DISPOSITION:  Patient will be admitted to Dr. Guardado, Hospitalist in guarded condition.     FINAL IMPRESSION  1. Pneumonia of left lower lobe due to infectious organism (HCC)    2. Hypoxia    3. Influenza A    4. Renal insufficiency    5. Calculus of gallbladder with acute cholecystitis without obstruction    6. Acute biliary pancreatitis, unspecified complication status          Daily MILLER (Scribe), am scribing for, and in the presence of, Barbie Dee M.D..    Electronically signed by: Daily Wiggins (Scribe), 12/23/2018    Barbie MILLER M.D. personally performed the services described in this documentation, as scribed by Daily Wiggins in my presence, and it is both accurate and complete.    The note accurately reflects work and decisions made by me.  Barbie Dee  12/23/2018  7:15 PM

## 2018-12-24 NOTE — ASSESSMENT & PLAN NOTE
- Smoking cessation education provided  - Nicotine patch    Spent 11 minutes on tobacco cessation counseling including nicotine patches, gum, and dangers of smoking. Also discussed options of nicotine patch, medical treatment with wellbutrin and chantix. Discussed the risks of smoking including increased risk of heart disease, stroke, cancer and COPD. Discussed the benefits of quitting smoking. Nicotine replacement protocol provided to the patient.    The pt indicated that will quit.     74899 (smoking and tobacco cessation counseling visit; intensive, greater than 10 minutes).

## 2018-12-25 ENCOUNTER — PATIENT OUTREACH (OUTPATIENT)
Dept: HEALTH INFORMATION MANAGEMENT | Facility: OTHER | Age: 38
End: 2018-12-25

## 2018-12-25 VITALS
HEART RATE: 69 BPM | DIASTOLIC BLOOD PRESSURE: 60 MMHG | SYSTOLIC BLOOD PRESSURE: 94 MMHG | RESPIRATION RATE: 18 BRPM | OXYGEN SATURATION: 97 % | TEMPERATURE: 97.1 F | WEIGHT: 223.55 LBS | BODY MASS INDEX: 32 KG/M2 | HEIGHT: 70 IN

## 2018-12-25 LAB
ANION GAP SERPL CALC-SCNC: 5 MMOL/L (ref 0–11.9)
BASOPHILS # BLD AUTO: 0.9 % (ref 0–1.8)
BASOPHILS # BLD: 0.03 K/UL (ref 0–0.12)
BUN SERPL-MCNC: 15 MG/DL (ref 8–22)
CALCIUM SERPL-MCNC: 7.3 MG/DL (ref 8.5–10.5)
CHLORIDE SERPL-SCNC: 105 MMOL/L (ref 96–112)
CO2 SERPL-SCNC: 27 MMOL/L (ref 20–33)
CREAT SERPL-MCNC: 1.09 MG/DL (ref 0.5–1.4)
EOSINOPHIL # BLD AUTO: 0 K/UL (ref 0–0.51)
EOSINOPHIL NFR BLD: 0 % (ref 0–6.9)
ERYTHROCYTE [DISTWIDTH] IN BLOOD BY AUTOMATED COUNT: 41 FL (ref 35.9–50)
GIANT PLATELETS BLD QL SMEAR: NORMAL
GLUCOSE SERPL-MCNC: 103 MG/DL (ref 65–99)
HCT VFR BLD AUTO: 42.3 % (ref 42–52)
HGB BLD-MCNC: 14 G/DL (ref 14–18)
LYMPHOCYTES # BLD AUTO: 1.5 K/UL (ref 1–4.8)
LYMPHOCYTES NFR BLD: 40.6 % (ref 22–41)
MANUAL DIFF BLD: NORMAL
MCH RBC QN AUTO: 29 PG (ref 27–33)
MCHC RBC AUTO-ENTMCNC: 33.1 G/DL (ref 33.7–35.3)
MCV RBC AUTO: 87.6 FL (ref 81.4–97.8)
MONOCYTES # BLD AUTO: 0.27 K/UL (ref 0–0.85)
MONOCYTES NFR BLD AUTO: 7.3 % (ref 0–13.4)
MORPHOLOGY BLD-IMP: NORMAL
NEUTROPHILS # BLD AUTO: 1.89 K/UL (ref 1.82–7.42)
NEUTROPHILS NFR BLD: 51.2 % (ref 44–72)
NRBC # BLD AUTO: 0 K/UL
NRBC BLD-RTO: 0 /100 WBC
PLATELET # BLD AUTO: 99 K/UL (ref 164–446)
PLATELET BLD QL SMEAR: NORMAL
PMV BLD AUTO: 11.5 FL (ref 9–12.9)
POTASSIUM SERPL-SCNC: 3.7 MMOL/L (ref 3.6–5.5)
PROCALCITONIN SERPL-MCNC: <0.05 NG/ML
RBC # BLD AUTO: 4.83 M/UL (ref 4.7–6.1)
RBC BLD AUTO: PRESENT
SMUDGE CELLS BLD QL SMEAR: NORMAL
SODIUM SERPL-SCNC: 137 MMOL/L (ref 135–145)
WBC # BLD AUTO: 3.7 K/UL (ref 4.8–10.8)

## 2018-12-25 PROCEDURE — 84145 PROCALCITONIN (PCT): CPT

## 2018-12-25 PROCEDURE — 36415 COLL VENOUS BLD VENIPUNCTURE: CPT

## 2018-12-25 PROCEDURE — 85027 COMPLETE CBC AUTOMATED: CPT

## 2018-12-25 PROCEDURE — A9270 NON-COVERED ITEM OR SERVICE: HCPCS | Performed by: INTERNAL MEDICINE

## 2018-12-25 PROCEDURE — 700101 HCHG RX REV CODE 250: Performed by: INTERNAL MEDICINE

## 2018-12-25 PROCEDURE — 80048 BASIC METABOLIC PNL TOTAL CA: CPT

## 2018-12-25 PROCEDURE — 700105 HCHG RX REV CODE 258: Performed by: INTERNAL MEDICINE

## 2018-12-25 PROCEDURE — 700102 HCHG RX REV CODE 250 W/ 637 OVERRIDE(OP): Performed by: INTERNAL MEDICINE

## 2018-12-25 PROCEDURE — 99239 HOSP IP/OBS DSCHRG MGMT >30: CPT | Performed by: INTERNAL MEDICINE

## 2018-12-25 PROCEDURE — 85007 BL SMEAR W/DIFF WBC COUNT: CPT

## 2018-12-25 PROCEDURE — 700111 HCHG RX REV CODE 636 W/ 250 OVERRIDE (IP): Performed by: INTERNAL MEDICINE

## 2018-12-25 RX ORDER — ACETAMINOPHEN 325 MG/1
650 TABLET ORAL EVERY 4 HOURS PRN
Status: DISCONTINUED | OUTPATIENT
Start: 2018-12-25 | End: 2018-12-25 | Stop reason: HOSPADM

## 2018-12-25 RX ORDER — DOXYCYCLINE 100 MG/1
100 TABLET ORAL EVERY 12 HOURS
Qty: 6 TAB | Refills: 0 | Status: SHIPPED | OUTPATIENT
Start: 2018-12-25 | End: 2018-12-28

## 2018-12-25 RX ORDER — OSELTAMIVIR PHOSPHATE 75 MG/1
75 CAPSULE ORAL EVERY 12 HOURS
Qty: 8 CAP | Refills: 0 | Status: SHIPPED | OUTPATIENT
Start: 2018-12-25 | End: 2018-12-29

## 2018-12-25 RX ADMIN — METRONIDAZOLE 500 MG: 500 INJECTION, SOLUTION INTRAVENOUS at 05:06

## 2018-12-25 RX ADMIN — DOXYCYCLINE 100 MG: 100 TABLET ORAL at 05:06

## 2018-12-25 RX ADMIN — OSELTAMIVIR PHOSPHATE 75 MG: 75 CAPSULE ORAL at 05:13

## 2018-12-25 RX ADMIN — SODIUM CHLORIDE: 9 INJECTION, SOLUTION INTRAVENOUS at 01:37

## 2018-12-25 RX ADMIN — ENOXAPARIN SODIUM 40 MG: 100 INJECTION SUBCUTANEOUS at 05:06

## 2018-12-25 RX ADMIN — OXYCODONE HYDROCHLORIDE 5 MG: 5 TABLET ORAL at 05:13

## 2018-12-25 ASSESSMENT — PAIN SCALES - GENERAL
PAINLEVEL_OUTOF10: 7
PAINLEVEL_OUTOF10: 3
PAINLEVEL_OUTOF10: 4
PAINLEVEL_OUTOF10: 0

## 2018-12-25 NOTE — DISCHARGE SUMMARY
Discharge Summary    CHIEF COMPLAINT ON ADMISSION  Chief Complaint   Patient presents with   • Loss of Appetite     x1 week, loss of appetite, +N, generalized weakness. was seen at . Flu A +. reports he was told to come here if he continued to be sick.    • Nausea       Reason for Admission  Flu Like Symptoms     Admission Date  12/23/2018    CODE STATUS  Full Code    HPI & HOSPITAL COURSE  This is a 38 y.o. male  andmitted for ad pain and SOB. PNA on admission and started iv abx.  Patient was noticed to have influenza A positive as outpatient.  On admission patient was also concerned to have a viral infection.  Patient was treated with IV Rocephin and doxy.  Patient also complained of right upper quadrant abdominal pain, ultrasound did show cholelithiasis.  HIDA scan confirmed patient had ejection fraction 16% concern of gallbladder dyskinesia.  General surgery recommend patient to follow-up as outpatient and consider lap sherri.  Patient agrees.  Patient was also started on Tamiflu for influenza A.  Patient currently symptoms improved significantly and patient does not require oxygen.  Patient wants to be discharged home with continue Tamiflu and follow-up as outpatient with surgery.       On admission patient was diagnosed with severe sepsis likely due to viral infection and patient does have acute kidney injury.  After fluid resuscitation patient's symptoms significantly improved.    Therefore, he is discharged in good and stable condition to home with close outpatient follow-up.    The patient met 2-midnight criteria for an inpatient stay at the time of discharge.    Discharge Date  12/25/2018    FOLLOW UP ITEMS POST DISCHARGE  none    DISCHARGE DIAGNOSES      CAP (community acquired pneumonia) POA: Yes    Calculus of gallbladder without cholecystitis without obstruction POA: Yes    Severe sepsis (HCC) POA: Yes    Acute kidney injury (HCC) POA: Yes    Transaminitis POA: Yes    Thrombocytopenia (HCC) POA: Yes     Tobacco dependence POA: Yes    FOLLOW UP  No future appointments.  Geronimo Fang M.D.  [address] [facility] [id number] [phone]      As needed, If symptoms worsen    Elias Adam M.D.  [address]  [address]  [facility] [id number] [phone]    Schedule an appointment as soon as possible for a visit  For lap sherri      MEDICATIONS ON DISCHARGE     Medication List      START taking these medications      Instructions   doxycycline monohydrate 100 MG tablet  Commonly known as:  ADOXA   Take 1 Tab by mouth every 12 hours for 3 days.  Dose:  100 mg     oseltamivir 75 MG Caps  Commonly known as:  TAMIFLU   Take 1 Cap by mouth every 12 hours for 4 days.  Dose:  75 mg            Allergies  No Known Allergies    DIET  Orders Placed This Encounter   Procedures   • Diet Order Cardiac     Standing Status:   Standing     Number of Occurrences:   1     Order Specific Question:   Diet:     Answer:   Cardiac [6]     Order Specific Question:   Macronutrient modifications:     Answer:   Low Fat [5]       ACTIVITY  As tolerated.  Weight bearing as tolerated    CONSULTATIONS  surgery    PROCEDURES  None    NM-BILIARY HIDA SCAN FATTY MEAL   Final Result      1.  Abnormal gallbladder ejection fraction = 16%. This may represent gallbladder dyskinesia.      2.  No evidence of cystic duct or common duct obstruction.      This study was obtained utilizing fatty meal challenge to induce gallbladder contraction due to national shortage of cholecystokinin.  There are no national standards for gallbladder ejection fraction calculated utilizing fatty meal challenge.  An    ejection fraction greater than 35% is most likely normal.  Gallbladder ejection fraction less than 35% may be abnormal but could be falsely positive.  Therefore, this test could be falsely positive.  Consider repeat imaging once cholecystokinin becomes    available.      US-RUQ   Final Result      1.  Gallstones within the gallbladder neck      2.   No evidence for cholecystitis or biliary obstruction      DX-CHEST-PORTABLE (1 VIEW)   Final Result      Possible basilar airspace process which could be pneumonia          PE:  Gen: AAOx3, NAD  Eyes: PELLA  Neck: no JVD, no lymphadenopathy  Cardia: RRR, no mrg  Lungs: CTAB, no rales, rhonci or wheezing  Abd: NABS, soft, non extended, no mass  EXT: No C/C/E, peripheral pulse 2+ b/l  Neuro: CN II-XII intact, non focal, reflex 2+ symmetrical  Skin: Intact, no lesion, warm  Psych: Appropriate.      LABORATORY  Lab Results   Component Value Date    SODIUM 137 12/25/2018    POTASSIUM 3.7 12/25/2018    CHLORIDE 105 12/25/2018    CO2 27 12/25/2018    GLUCOSE 103 (H) 12/25/2018    BUN 15 12/25/2018    CREATININE 1.09 12/25/2018        Lab Results   Component Value Date    WBC 3.7 (L) 12/25/2018    HEMOGLOBIN 14.0 12/25/2018    HEMATOCRIT 42.3 12/25/2018    PLATELETCT 99 (L) 12/25/2018        Total time of the discharge process exceeds 36 minutes excluding smoking consultation.    Spent 12 minutes on tobacco cessation counseling including nicotine patches, gum, and dangers of smoking. Discussed the risks of smoking including increased risk of heart disease, stroke, cancer and COPD. Discussed the benefits of quitting smoking. Nicotine replacement protocol provided to the patient.    The pt indicated that will quit.     37787 (smoking and tobacco cessation counseling visit; intensive, greater than 10 minutes).

## 2018-12-25 NOTE — PROGRESS NOTES
Discharge teaching done.  Rx x2 called in to pharmacy  PIV removed    Discharging Patient home per physician order.      Discharged with family.      Demonstrated understanding of discharge instructions, follow up appointments, home medications, prescriptions.      Ambulating without assistance, voiding without difficulty, pain well controlled, tolerating oral medications, oxygen saturation greater than 90% , tolerating diet.      Educational handouts given and discussed.      Verbalized understanding of discharge instructions and educational handouts.     All questions answered.      Belongings with patient at time of discharge.

## 2018-12-25 NOTE — DISCHARGE INSTRUCTIONS
Discharge Instructions    Discharged to home by car with relative. Discharged via wheelchair, hospital escort: Yes.  Special equipment needed: Not Applicable    Be sure to schedule a follow-up appointment with your primary care doctor or any specialists as instructed.     Discharge Plan:   Diet Plan: Discussed  Activity Level: Discussed  Smoking Cessation Offered: Patient Counseled  Confirmed Follow up Appointment: Patient to Call and Schedule Appointment  Confirmed Symptoms Management: Discussed  Medication Reconciliation Updated: Yes  Influenza Vaccine Indication: Patient Refuses    I understand that a diet low in cholesterol, fat, and sodium is recommended for good health. Unless I have been given specific instructions below for another diet, I accept this instruction as my diet prescription.   Other diet: Regular    Special Instructions: None    · Is patient discharged on Warfarin / Coumadin?   No       Influenza, Adult    Influenza, more commonly known as “the flu,” is a viral infection that primarily affects the respiratory tract. The respiratory tract includes organs that help you breathe, such as the lungs, nose, and throat. The flu causes many common cold symptoms, as well as a high fever and body aches.  The flu spreads easily from person to person (is contagious). Getting a flu shot (influenza vaccination) every year is the best way to prevent influenza.    What are the causes?  Influenza is caused by a virus. You can catch the virus by:  · Breathing in droplets from an infected person's cough or sneeze.  · Touching something that was recently contaminated with the virus and then touching your mouth, nose, or eyes.  ·   What increases the risk?  The following factors may make you more likely to get the flu:  · Not cleaning your hands frequently with soap and water or alcohol-based hand .  · Having close contact with many people during cold and flu season.  · Touching your mouth, eyes, or nose  without washing or sanitizing your hands first.  · Not drinking enough fluids or not eating a healthy diet.  · Not getting enough sleep or exercise.  · Being under a high amount of stress.  · Not getting a yearly (annual) flu shot.  You may be at a higher risk of complications from the flu, such as a severe lung infection (pneumonia), if you:  · Are over the age of 65.  · Are pregnant.  · Have a weakened disease-fighting system (immune system). You may have a weakened immune system if you:  ¨ Have HIV or AIDS.  ¨ Are undergoing chemotherapy.  ¨ Are taking medicines that reduce the activity of (suppress) the immune system.  · Have a long-term (chronic) illness, such as heart disease, kidney disease, diabetes, or lung disease.  · Have a liver disorder.  · Are obese.  · Have anemia.  ·   What are the signs or symptoms?  Symptoms of this condition typically last 4-10 days and may include:  · Fever.  · Chills.  · Headache, body aches, or muscle aches.  · Sore throat.  · Cough.  · Runny or congested nose.  · Chest discomfort and cough.  · Poor appetite.  · Weakness or tiredness (fatigue).  · Dizziness.  · Nausea or vomiting.  ·   How is this diagnosed?  This condition may be diagnosed based on your medical history and a physical exam. Your health care provider may do a nose or throat swab test to confirm the diagnosis.    How is this treated?  If influenza is detected early, you can be treated with antiviral medicine that can reduce the length of your illness and the severity of your symptoms. This medicine may be given by mouth (orally) or through an IV tube that is inserted in one of your veins.  The goal of treatment is to relieve symptoms by taking care of yourself at home. This may include taking over-the-counter medicines, drinking plenty of fluids, and adding humidity to the air in your home.  In some cases, influenza goes away on its own. Severe influenza or complications from influenza may be treated in a  hospital.    Follow these instructions at home:  · Take over-the-counter and prescription medicines only as told by your health care provider.  · Use a cool mist humidifier to add humidity to the air in your home. This can make breathing easier.  · Rest as needed.  · Drink enough fluid to keep your urine clear or pale yellow.  · Cover your mouth and nose when you cough or sneeze.  · Wash your hands with soap and water often, especially after you cough or sneeze. If soap and water are not available, use hand .  · Stay home from work or school as told by your health care provider. Unless you are visiting your health care provider, try to avoid leaving home until your fever has been gone for 24 hours without the use of medicine.  · Keep all follow-up visits as told by your health care provider. This is important.  ·   How is this prevented?  · Getting an annual flu shot is the best way to avoid getting the flu. You may get the flu shot in late summer, fall, or winter. Ask your health care provider when you should get your flu shot.  · Wash your hands often or use hand  often.  · Avoid contact with people who are sick during cold and flu season.  · Eat a healthy diet, drink plenty of fluids, get enough sleep, and exercise regularly.  ·   Contact a health care provider if:  · You develop new symptoms.  · You have:  ¨ Chest pain.  ¨ Diarrhea.  ¨ A fever.  · Your cough gets worse.  · You produce more mucus.  · You feel nauseous or you vomit.  ·   Get help right away if:  · You develop shortness of breath or difficulty breathing.  · Your skin or nails turn a bluish color.  · You have severe pain or stiffness in your neck.  · You develop a sudden headache or sudden pain in your face or ear.  · You cannot stop vomiting.  ·   This information is not intended to replace advice given to you by your health care provider. Make sure you discuss any questions you have with your health care provider.  Document  Released: 12/15/2001 Document Revised: 05/25/2017 Document Reviewed: 10/11/2016  [employer] Interactive Patient Education © 2017 [employer]      Pneumocystis Pneumonia    Pneumocystis pneumonia is an infection that affects the lungs. The infection is extremely rare in healthy people. It most frequently occurs in people whose natural disease-fighting system (immune system) is weak.    What are the causes?  This condition is caused by breathing in a fungus called Pneumocystis jiroveci.    What are the signs or symptoms?  Common symptoms of this condition include:  · Fever.  · Mild or dry cough.  · Shortness of breath, especially with any physical activity.  · Rapid breathing.  ·   How is this diagnosed?  This condition may be diagnosed with tests, such as:  · A test in which a sample of fluid or tissue from your lungs is looked at under a microscope (immunologic stain). To get the sample, you may be asked to cough into a tissue. Or, a small, flexible tube will be guided to your lungs through your nose or mouth to get the sample (bronchoscopy).  · A chest X-ray.  · Blood tests.  ·   How is this treated?  This condition is treated with antibiotic medicine. Sometimes it is also treated with a medicine to reduce inflammation (steroid). Treatment may begin before test results confirm your diagnosis. This is because the condition is very serious. Treatment may take several weeks.    Follow these instructions at home:  · Take over-the-counter and prescription medicines only as told by your health care provider.  · Take your antibiotic medicine as told by your health care provider. Do not stop taking the antibiotic even if you start to feel better.  · Do not smoke. Smoking can make your condition worse.  · Keep all follow-up visits as told by your health care provider. This is important.  ·   How is this prevented?  If you are at high risk for getting this condition again, your health care provider may prescribe an  antibiotic to prevent this from happening.    Contact a health care provider if:  · Any of your symptoms are getting worse instead of better.  · You have nausea or vomiting.  · You have diarrhea.  · You have a skin rash.  ·   This information is not intended to replace advice given to you by your health care provider. Make sure you discuss any questions you have with your health care provider.  Document Released: 03/09/2004 Document Revised: 09/29/2017 Document Reviewed: 09/29/2017  [employer] Interactive Patient Education © 2017 [employer]      Depression / Suicide Risk    As you are discharged from this [facility] facility, it is important to learn how to keep safe from harming yourself.    Recognize the warning signs:  · Abrupt changes in personality, positive or negative- including increase in energy   · Giving away possessions  · Change in eating patterns- significant weight changes-  positive or negative  · Change in sleeping patterns- unable to sleep or sleeping all the time   · Unwillingness or inability to communicate  · Depression  · Unusual sadness, discouragement and loneliness  · Talk of wanting to die  · Neglect of personal appearance   · Rebelliousness- reckless behavior  · Withdrawal from people/activities they love  · Confusion- inability to concentrate     If you or a loved one observes any of these behaviors or has concerns about self-harm, here's what you can do:  · Talk about it- your feelings and reasons for harming yourself  · Remove any means that you might use to hurt yourself (examples: pills, rope, extension cords, firearm)  · Get professional help from the community (Mental Health, Substance Abuse, psychological counseling)  · Do not be alone:Call your Safe Contact- someone whom you trust who will be there for you.  · Call your local CRISIS HOTLINE 066-8314 or 892-686-4148  · Call your local Children's Mobile Crisis Response Team [place] (841) 056-5167 or  www.[employer]  · Call the toll free National Suicide Prevention Hotlines   · National Suicide Prevention Lifeline 241-313-KMKK (3130)  · National Hope Line Network 800-SUICIDE (348-8969)

## 2018-12-25 NOTE — PROGRESS NOTES
Assumed care at 1900    Received report from day shift RN.    Reviewed recent lab results, notes, orders, and MAR  POC discussed and updated on care board  Bed is in the lowest and locked position, call light within reach      Patient is on droplet precautions for flu and CAP.

## 2018-12-25 NOTE — CARE PLAN
Problem: Safety  Goal: Will remain free from injury  Outcome: PROGRESSING AS EXPECTED  Fall precautions are in place, patient demonstrates understanding    Problem: Infection  Goal: Will remain free from infection  Outcome: PROGRESSING AS EXPECTED  Patient educated brush

## 2018-12-25 NOTE — PROGRESS NOTES
Dr Dior notified of pt wishing to D/C today  Stated he will write electronic RX and send to pharmacy and write D/C orders

## 2018-12-25 NOTE — CARE PLAN
Problem: Safety  Goal: Will remain free from injury  Outcome: PROGRESSING AS EXPECTED  Bed low and locked, call light and belongings in reach, hourly rounding in place, pt calls appropriately for assistance    Problem: Bowel/Gastric:  Goal: Normal bowel function is maintained or improved  Outcome: PROGRESSING SLOWER THAN EXPECTED  +N/V throughout NOC and day shifts

## 2018-12-28 LAB
BACTERIA BLD CULT: NORMAL
BACTERIA BLD CULT: NORMAL
SIGNIFICANT IND 70042: NORMAL
SIGNIFICANT IND 70042: NORMAL
SITE SITE: NORMAL
SITE SITE: NORMAL
SOURCE SOURCE: NORMAL
SOURCE SOURCE: NORMAL

## 2018-12-31 LAB
SIN NOMBRE VIRUS 93385: NORMAL
SIN NOMBRE VIRUS IGM 93386: NORMAL

## 2019-01-08 ENCOUNTER — NON-PROVIDER VISIT (OUTPATIENT)
Dept: URGENT CARE | Facility: PHYSICIAN GROUP | Age: 39
End: 2019-01-08

## 2019-01-08 DIAGNOSIS — Z02.1 PRE-EMPLOYMENT DRUG SCREENING: ICD-10-CM

## 2019-01-08 PROCEDURE — 8216 PR HAIR DRUG SCREEN: Performed by: PHYSICIAN ASSISTANT

## 2019-01-14 ENCOUNTER — HOSPITAL ENCOUNTER (OUTPATIENT)
Facility: MEDICAL CENTER | Age: 39
End: 2019-01-14
Attending: SURGERY | Admitting: SURGERY
Payer: MEDICAID

## 2019-01-14 VITALS
SYSTOLIC BLOOD PRESSURE: 97 MMHG | BODY MASS INDEX: 31.72 KG/M2 | HEART RATE: 62 BPM | TEMPERATURE: 98.1 F | HEIGHT: 70 IN | WEIGHT: 221.56 LBS | DIASTOLIC BLOOD PRESSURE: 64 MMHG | OXYGEN SATURATION: 93 % | RESPIRATION RATE: 14 BRPM

## 2019-01-14 DIAGNOSIS — G89.18 POSTOPERATIVE PAIN: ICD-10-CM

## 2019-01-14 LAB — PATHOLOGY CONSULT NOTE: NORMAL

## 2019-01-14 PROCEDURE — 700111 HCHG RX REV CODE 636 W/ 250 OVERRIDE (IP): Performed by: ANESTHESIOLOGY

## 2019-01-14 PROCEDURE — 501399 HCHG SPECIMAN BAG, ENDO CATC: Performed by: SURGERY

## 2019-01-14 PROCEDURE — 500868 HCHG NEEDLE, SURGI(VARES): Performed by: SURGERY

## 2019-01-14 PROCEDURE — 501838 HCHG SUTURE GENERAL: Performed by: SURGERY

## 2019-01-14 PROCEDURE — 160009 HCHG ANES TIME/MIN: Performed by: SURGERY

## 2019-01-14 PROCEDURE — 700102 HCHG RX REV CODE 250 W/ 637 OVERRIDE(OP): Performed by: ANESTHESIOLOGY

## 2019-01-14 PROCEDURE — 501577 HCHG TROCAR, STEP 11MM: Performed by: SURGERY

## 2019-01-14 PROCEDURE — 160035 HCHG PACU - 1ST 60 MINS PHASE I: Performed by: SURGERY

## 2019-01-14 PROCEDURE — 501338 HCHG SHEARS, ENDO: Performed by: SURGERY

## 2019-01-14 PROCEDURE — 160028 HCHG SURGERY MINUTES - 1ST 30 MINS LEVEL 3: Performed by: SURGERY

## 2019-01-14 PROCEDURE — 160046 HCHG PACU - 1ST 60 MINS PHASE II: Performed by: SURGERY

## 2019-01-14 PROCEDURE — 160025 RECOVERY II MINUTES (STATS): Performed by: SURGERY

## 2019-01-14 PROCEDURE — 160048 HCHG OR STATISTICAL LEVEL 1-5: Performed by: SURGERY

## 2019-01-14 PROCEDURE — 501583 HCHG TROCAR, THRD CAN&SEAL 5X100: Performed by: SURGERY

## 2019-01-14 PROCEDURE — 501570 HCHG TROCAR, SEPARATOR: Performed by: SURGERY

## 2019-01-14 PROCEDURE — 88304 TISSUE EXAM BY PATHOLOGIST: CPT

## 2019-01-14 PROCEDURE — 160047 HCHG PACU  - EA ADDL 30 MINS PHASE II: Performed by: SURGERY

## 2019-01-14 PROCEDURE — A9270 NON-COVERED ITEM OR SERVICE: HCPCS | Performed by: ANESTHESIOLOGY

## 2019-01-14 PROCEDURE — 501568 HCHG TROCAR, BLUNTPORT 12MM: Performed by: SURGERY

## 2019-01-14 PROCEDURE — 700111 HCHG RX REV CODE 636 W/ 250 OVERRIDE (IP)

## 2019-01-14 PROCEDURE — 700101 HCHG RX REV CODE 250

## 2019-01-14 PROCEDURE — 160039 HCHG SURGERY MINUTES - EA ADDL 1 MIN LEVEL 3: Performed by: SURGERY

## 2019-01-14 PROCEDURE — 500800 HCHG LAPAROSCOPIC J/L HOOK: Performed by: SURGERY

## 2019-01-14 PROCEDURE — 160002 HCHG RECOVERY MINUTES (STAT): Performed by: SURGERY

## 2019-01-14 PROCEDURE — 502571 HCHG PACK, LAP CHOLE: Performed by: SURGERY

## 2019-01-14 PROCEDURE — 160036 HCHG PACU - EA ADDL 30 MINS PHASE I: Performed by: SURGERY

## 2019-01-14 RX ORDER — SODIUM CHLORIDE, SODIUM LACTATE, POTASSIUM CHLORIDE, CALCIUM CHLORIDE 600; 310; 30; 20 MG/100ML; MG/100ML; MG/100ML; MG/100ML
INJECTION, SOLUTION INTRAVENOUS ONCE
Status: DISCONTINUED | OUTPATIENT
Start: 2019-01-14 | End: 2019-01-14 | Stop reason: HOSPADM

## 2019-01-14 RX ORDER — OSELTAMIVIR PHOSPHATE 75 MG/1
75 CAPSULE ORAL 2 TIMES DAILY
COMMUNITY
End: 2019-05-07

## 2019-01-14 RX ORDER — SODIUM CHLORIDE, SODIUM LACTATE, POTASSIUM CHLORIDE, CALCIUM CHLORIDE 600; 310; 30; 20 MG/100ML; MG/100ML; MG/100ML; MG/100ML
INJECTION, SOLUTION INTRAVENOUS
Status: COMPLETED | OUTPATIENT
Start: 2019-01-14 | End: 2019-01-14

## 2019-01-14 RX ORDER — HYDROMORPHONE HYDROCHLORIDE 1 MG/ML
0.2 INJECTION, SOLUTION INTRAMUSCULAR; INTRAVENOUS; SUBCUTANEOUS
Status: DISCONTINUED | OUTPATIENT
Start: 2019-01-14 | End: 2019-01-14 | Stop reason: HOSPADM

## 2019-01-14 RX ORDER — SODIUM CHLORIDE, SODIUM LACTATE, POTASSIUM CHLORIDE, CALCIUM CHLORIDE 600; 310; 30; 20 MG/100ML; MG/100ML; MG/100ML; MG/100ML
INJECTION, SOLUTION INTRAVENOUS CONTINUOUS
Status: DISCONTINUED | OUTPATIENT
Start: 2019-01-14 | End: 2019-01-14 | Stop reason: HOSPADM

## 2019-01-14 RX ORDER — HYDROMORPHONE HYDROCHLORIDE 1 MG/ML
0.1 INJECTION, SOLUTION INTRAMUSCULAR; INTRAVENOUS; SUBCUTANEOUS
Status: DISCONTINUED | OUTPATIENT
Start: 2019-01-14 | End: 2019-01-14 | Stop reason: HOSPADM

## 2019-01-14 RX ORDER — BUPIVACAINE HYDROCHLORIDE AND EPINEPHRINE 5; 5 MG/ML; UG/ML
INJECTION, SOLUTION EPIDURAL; INTRACAUDAL; PERINEURAL
Status: DISCONTINUED | OUTPATIENT
Start: 2019-01-14 | End: 2019-01-14 | Stop reason: HOSPADM

## 2019-01-14 RX ORDER — GABAPENTIN 300 MG/1
300 CAPSULE ORAL ONCE
Status: COMPLETED | OUTPATIENT
Start: 2019-01-14 | End: 2019-01-14

## 2019-01-14 RX ORDER — HYDROMORPHONE HYDROCHLORIDE 1 MG/ML
0.4 INJECTION, SOLUTION INTRAMUSCULAR; INTRAVENOUS; SUBCUTANEOUS
Status: DISCONTINUED | OUTPATIENT
Start: 2019-01-14 | End: 2019-01-14 | Stop reason: HOSPADM

## 2019-01-14 RX ORDER — DOCUSATE SODIUM 100 MG/1
100 CAPSULE, LIQUID FILLED ORAL 2 TIMES DAILY
COMMUNITY
End: 2019-05-07

## 2019-01-14 RX ORDER — HALOPERIDOL 5 MG/ML
1 INJECTION INTRAMUSCULAR
Status: DISCONTINUED | OUTPATIENT
Start: 2019-01-14 | End: 2019-01-14 | Stop reason: HOSPADM

## 2019-01-14 RX ORDER — METOPROLOL TARTRATE 1 MG/ML
1 INJECTION, SOLUTION INTRAVENOUS
Status: DISCONTINUED | OUTPATIENT
Start: 2019-01-14 | End: 2019-01-14 | Stop reason: HOSPADM

## 2019-01-14 RX ORDER — HYDROCODONE BITARTRATE AND ACETAMINOPHEN 5; 325 MG/1; MG/1
1-2 TABLET ORAL EVERY 6 HOURS PRN
Qty: 30 TAB | Refills: 0 | Status: SHIPPED | OUTPATIENT
Start: 2019-01-14 | End: 2019-01-21

## 2019-01-14 RX ORDER — DIPHENHYDRAMINE HYDROCHLORIDE 50 MG/ML
12.5 INJECTION INTRAMUSCULAR; INTRAVENOUS
Status: DISCONTINUED | OUTPATIENT
Start: 2019-01-14 | End: 2019-01-14 | Stop reason: HOSPADM

## 2019-01-14 RX ORDER — DOXYCYCLINE 100 MG/1
100 TABLET ORAL 2 TIMES DAILY
COMMUNITY
End: 2019-05-07

## 2019-01-14 RX ORDER — CELECOXIB 200 MG/1
200 CAPSULE ORAL ONCE
Status: COMPLETED | OUTPATIENT
Start: 2019-01-14 | End: 2019-01-14

## 2019-01-14 RX ORDER — MEPERIDINE HYDROCHLORIDE 25 MG/ML
12.5 INJECTION INTRAMUSCULAR; INTRAVENOUS; SUBCUTANEOUS
Status: DISCONTINUED | OUTPATIENT
Start: 2019-01-14 | End: 2019-01-14 | Stop reason: HOSPADM

## 2019-01-14 RX ORDER — HYDRALAZINE HYDROCHLORIDE 20 MG/ML
5 INJECTION INTRAMUSCULAR; INTRAVENOUS
Status: DISCONTINUED | OUTPATIENT
Start: 2019-01-14 | End: 2019-01-14 | Stop reason: HOSPADM

## 2019-01-14 RX ORDER — OXYCODONE HCL 5 MG/5 ML
10 SOLUTION, ORAL ORAL
Status: COMPLETED | OUTPATIENT
Start: 2019-01-14 | End: 2019-01-14

## 2019-01-14 RX ORDER — ONDANSETRON 2 MG/ML
4 INJECTION INTRAMUSCULAR; INTRAVENOUS
Status: COMPLETED | OUTPATIENT
Start: 2019-01-14 | End: 2019-01-14

## 2019-01-14 RX ORDER — OXYCODONE HCL 5 MG/5 ML
5 SOLUTION, ORAL ORAL
Status: COMPLETED | OUTPATIENT
Start: 2019-01-14 | End: 2019-01-14

## 2019-01-14 RX ORDER — ACETAMINOPHEN 500 MG
1000 TABLET ORAL ONCE
Status: COMPLETED | OUTPATIENT
Start: 2019-01-14 | End: 2019-01-14

## 2019-01-14 RX ADMIN — FENTANYL CITRATE 50 MCG: 50 INJECTION, SOLUTION INTRAMUSCULAR; INTRAVENOUS at 16:59

## 2019-01-14 RX ADMIN — GABAPENTIN 300 MG: 300 CAPSULE ORAL at 11:10

## 2019-01-14 RX ADMIN — ACETAMINOPHEN 1000 MG: 500 TABLET, FILM COATED ORAL at 11:09

## 2019-01-14 RX ADMIN — FENTANYL CITRATE 50 MCG: 50 INJECTION, SOLUTION INTRAMUSCULAR; INTRAVENOUS at 14:45

## 2019-01-14 RX ADMIN — CELECOXIB 200 MG: 200 CAPSULE ORAL at 11:09

## 2019-01-14 RX ADMIN — ONDANSETRON 4 MG: 2 INJECTION INTRAMUSCULAR; INTRAVENOUS at 14:20

## 2019-01-14 RX ADMIN — FENTANYL CITRATE 50 MCG: 50 INJECTION, SOLUTION INTRAMUSCULAR; INTRAVENOUS at 14:15

## 2019-01-14 RX ADMIN — OXYCODONE HYDROCHLORIDE 10 MG: 5 SOLUTION ORAL at 14:21

## 2019-01-14 ASSESSMENT — PAIN SCALES - GENERAL
PAINLEVEL_OUTOF10: 0
PAINLEVEL_OUTOF10: 5
PAINLEVEL_OUTOF10: 10
PAINLEVEL_OUTOF10: 8
PAINLEVEL_OUTOF10: 6
PAINLEVEL_OUTOF10: 8
PAINLEVEL_OUTOF10: 10

## 2019-01-14 NOTE — DISCHARGE INSTRUCTIONS
ACTIVITY: Rest and take it easy for the first 24 hours.  A responsible adult is recommended to remain with you during that time.  It is normal to feel sleepy.  We encourage you to not do anything that requires balance, judgment or coordination.    MILD FLU-LIKE SYMPTOMS ARE NORMAL. YOU MAY EXPERIENCE GENERALIZED MUSCLE ACHES, THROAT IRRITATION, HEADACHE AND/OR SOME NAUSEA.    FOR 24 HOURS DO NOT:  Drive, operate machinery or run household appliances.  Drink beer or alcoholic beverages.   Make important decisions or sign legal documents.    SPECIAL INSTRUCTIONS:   Laparoscopic Cholecystectomy, Care After  These instructions give you information on caring for yourself after your procedure. Your doctor may also give you more specific instructions. Call your doctor if you have any problems or questions after your procedure.   HOME CARE  · Change your bandages (dressings) as told by your doctor.  · Keep the wound dry and clean. Wash the wound gently with soap and water. Pat the wound dry with a clean towel.  · Do not take baths, swim, or use hot tubs for 2 weeks, or as told by your doctor.  · Only take medicine as told by your doctor.  · Eat a normal diet as told by your doctor.  · Do not lift anything heavier than 10 pounds (4.5 kg) until your doctor says it is okay.  · Do not play contact sports for 1 week, or as told by your doctor.  GET HELP IF:  · Your wound is red, puffy (swollen), or painful.  · You have yellowish-white fluid (pus) coming from the wound.  · You have fluid draining from the wound for more than 1 day.  · You have a bad smell coming from the wound.  · Your wound breaks open.  GET HELP RIGHT AWAY IF:   · You have a rash.  · You have trouble breathing.  · You have chest pain.  · You have a fever.  · You have pain in the shoulders (shoulder strap areas) that is getting worse.  · You feel dizzy or pass out (faint).  · You have severe belly (abdominal) pain.  · You feel sick to your stomach (nauseous)  or throw up (vomit) for more than 1 day.     This information is not intended to replace advice given to you by your health care provider. Make sure you discuss any questions you have with your health care provider.     Document Released: 09/26/2009 Document Revised: 10/08/2014 Document Reviewed: 07/30/2014  [employer] Interactive Patient Education ©2016 [employer].    DIET: To avoid nausea, slowly advance diet as tolerated, avoiding spicy or greasy foods for the first day.  Add more substantial food to your diet according to your physician's instructions.  Babies can be fed formula or breast milk as soon as they are hungry.  INCREASE FLUIDS AND FIBER TO AVOID CONSTIPATION.    SURGICAL DRESSING/BATHING: Follow instructions given to you by MD. Call with any questions.    FOLLOW-UP APPOINTMENT:  A follow-up appointment should be arranged with your doctor in 1-2 weeks; call to schedule Dr. Adam [phone]*.    You should CALL YOUR PHYSICIAN if you develop:  Fever greater than 101 degrees F.  Pain not relieved by medication, or persistent nausea or vomiting.  Excessive bleeding (blood soaking through dressing) or unexpected drainage from the wound.  Extreme redness or swelling around the incision site, drainage of pus or foul smelling drainage.  Inability to urinate or empty your bladder within 8 hours.  Problems with breathing or chest pain.    You should call [phone] if you develop problems with breathing or chest pain.  If you are unable to contact your doctor or surgical center, you should go to the nearest emergency room or urgent care center.  Physician's telephone #: Dr. Adam [phone]    If any questions arise, call your doctor.  If your doctor is not available, please feel free to call the Surgical Center at [phone].  The Center is open Monday through Friday from 7AM to 7PM.  You can also call the HEALTH HOTLINE open 24 hours/day, 7 days/week and speak to a nurse at [phone], or toll  free at [phone].    A registered nurse may call you a few days after your surgery to see how you are doing after your procedure.    MEDICATIONS: Resume taking daily medication.  Take prescribed pain medication with food.  If no medication is prescribed, you may take non-aspirin pain medication if needed.  PAIN MEDICATION CAN BE VERY CONSTIPATING.  Take a stool softener or laxative such as senokot, pericolace, or milk of magnesia if needed.    Prescription given for NORCO.  Last pain medication given at 2:20pm (Can have next pain medication at 8:20pm).     If your physician has prescribed pain medication that includes Acetaminophen (Tylenol), do not take additional Acetaminophen (Tylenol) while taking the prescribed medication.    Depression / Suicide Risk    As you are discharged from this [facility] facility, it is important to learn how to keep safe from harming yourself.    Recognize the warning signs:  · Abrupt changes in personality, positive or negative- including increase in energy   · Giving away possessions  · Change in eating patterns- significant weight changes-  positive or negative  · Change in sleeping patterns- unable to sleep or sleeping all the time   · Unwillingness or inability to communicate  · Depression  · Unusual sadness, discouragement and loneliness  · Talk of wanting to die  · Neglect of personal appearance   · Rebelliousness- reckless behavior  · Withdrawal from people/activities they love  · Confusion- inability to concentrate     If you or a loved one observes any of these behaviors or has concerns about self-harm, here's what you can do:  · Talk about it- your feelings and reasons for harming yourself  · Remove any means that you might use to hurt yourself (examples: pills, rope, extension cords, firearm)  · Get professional help from the community (Mental Health, Substance Abuse, psychological counseling)  · Do not be alone:Call your Safe Contact- someone whom you trust who  will be there for you.  · Call your local CRISIS HOTLINE 946-0630 or 883-729-8826  · Call your local Children's Mobile Crisis Response Team [place] (779) 908-5197 or www.[employer]  · Call the toll free National Suicide Prevention Hotlines   · National Suicide Prevention Lifeline 423-727-MFJF (7799)  · National [employer] Line Network 800-SUICIDE (699-0068)

## 2019-01-14 NOTE — OP REPORT
Operative Report    Date: 1/14/2019    Surgeon: Elias Adam M.D.    Assistant: BHARGAVI Orellana    Anesthesiologist: Dr. Maddie Patel    Preoperative Diagnosis: biliary dyskinesia    Postoperative Diagnosis: same    Procedure Performed: Laparoscopic cholecystectomy, lysis of adhesions    Estimated Blood Loss: 80 cc     Specimens: gallbladder for permanent pathology    Complications: none     Drains: none     Findings: dense adhesions to anterior abdominal wall from previous laparotomy, grossly normal gallbladder. -22 modifier due to 100% increased time over normal due to lysis of adhesions.    Procedure in detail:   Informed consent was obtained. Risks, including but not limited to bleeding, infection, and injury to bile ducts or other structures were discussed with the patient. They were willing to proceed.     The patient was brought to the operating room and was placed in the supine position where general endotracheal anesthesia was induced. SCDs were in place and functioning. Preoperative antibiotics of ancef were given before incision time. The patient's abdomen was prepped and draped in the usual sterile fashion.    After infiltration of local anesthetic, a skin incision was made to accommodate a 12 mm port infra-umbilically. The fascia was incised, and 0-Vicryl stay sutures were placed. The Hasan trocar was inserted. We then placed the 5mm 30 degree camera and inspected the abdomen for evidence of trauma , and found none. However, there was large amount of adhesions in the midline. The right lower quadrant 5 mm trocar was inserted, and lysis of adhesions was performed. Eventually, after extensive lysis of adhesions, the other trocars were inserted including another 5 mm and a sub-xiphoid 11 mm. Additional adhesiolysis was performed.       The gallbladder was identified, it appeared grossly normal. The gallbladder was retracted cephalad and caudally using gallbladder graspers. Using the [place], we  were able to peel the overlying peritoneum off the cystic duct, and circumferentially dissect it. We used electrocautery to futher remove the peritoneum off the gallbladder, to allow visualization of the bottom portion of the cystic plate. We then were able to further dissect Calot's triangle until we identified the cystic artery, which was circumferrentially dissected. This allowed visualization of the complete critical view of safety.      We then doubly clipped the cystic duct distally and divided it. We then doubly clipped the cystic artery  and divided it.Then, using electrocautery, we removed the gallbladder from the liver bed. After ensuring gallbladder bed hemostasis with cautery, we removed the gallbladder and placed it in an endocatch bag, and removed it from the epigastric port site.    The right upper quadrant was irrigated copiously with normal saline solution. We inspected the cystic duct and artery stumps, and found them to be intact. The liver bed was hemostatic.    The trocars were removed under direct visualization.    The fascia on the all port sites >10 mm were closed with Vicryl sutures. The skin of all incisions was closed with running subcuticular suture.    All sponge, needle, and instrument counts were reported as correct at the end of the procedure. The patient tolerated the procedure well and left the operating room for the recovery room in stable and satisfactory condition.      Disposition: stable, extubated, to PACU    Elias Adam M.D.  [facility]  [phone]

## 2019-01-14 NOTE — PROGRESS NOTES
Laparoscopic Cholecystectomy D/C instructions:    1. DIET: Upon discharge from the hospital you may resume your normal preoperative diet. Depending on how you are feeling and whether you have nausea or not, you may wish to stay with a bland diet for the first few days. However, you can advance this as quickly as you feel ready.    2. ACTIVITIES: After discharge from the hospital, you may resume full routine activities. However, there should be no heavy lifting (greater than 15 pounds) and no strenuous activities until after your follow-up visit. Otherwise, routine activities of daily living are acceptable.    3. DRIVING: You may drive whenever you are off pain medications and are able to perform the activities needed to drive, i.e. turning, bending, twisting, etc.    4. BATHING: You may get the wound wet at any time after leaving the hospital. You may shower, but do not submerge in a bath for at least a week. Dressings may come off after 48 hours.    5. BOWEL FUNCTION: A few patients, after this operation, will develop either frequent or loose stools after meals. This usually corrects itself after a few days, to a few weeks. If this occurs, do not worry; it is not unusual and will resolve. Much more common than loose stools, is constipation. The combination of pain medication and decreased activity level can cause constipation in otherwise normal patients. If you feel this is occurring, take a laxative (Milk of Magnesia, Ex-Lax, Senokot, etc.) until the problem has resolved.    6. PAIN MEDICATION: You will be given a prescription for pain medication at discharge. Please take these as directed. It is important to remember not to take medications on an empty stomach as this may cause nausea.    7.CALL IF YOU HAVE: (1) Fevers to more than 1010 F, (2) Unusual chest or leg pain, (3) Drainage or fluid from incision that may be foul smelling, increased tenderness or soreness at the wound or the wound edges are no longer  together, redness or swelling at the incision site. Please do not hesitate to call with any other questions.     8. APPOINTMENT: Contact our office at [phone] for a follow-up appointment in 1 to 2 weeks following your procedure.    If you have any additional questions, please do not hesitate to call the office and speak to either myself or the physician on call.    Office address:  [address] [place] [address], [place] [address]    Elias Adam M.D.  [facility]  [phone]

## 2019-01-14 NOTE — PROGRESS NOTES
Med rec complete per pt at bedside and historical notes from Hospital discharge.  Interviewed pt with family at bedside with permission from pt  Allergies reviewed and updated.

## 2019-05-07 ENCOUNTER — HOSPITAL ENCOUNTER (EMERGENCY)
Facility: MEDICAL CENTER | Age: 39
End: 2019-05-07
Attending: EMERGENCY MEDICINE
Payer: MEDICAID

## 2019-05-07 VITALS
DIASTOLIC BLOOD PRESSURE: 82 MMHG | HEART RATE: 65 BPM | HEIGHT: 71 IN | OXYGEN SATURATION: 96 % | BODY MASS INDEX: 32.87 KG/M2 | WEIGHT: 234.79 LBS | SYSTOLIC BLOOD PRESSURE: 134 MMHG | RESPIRATION RATE: 16 BRPM | TEMPERATURE: 98.1 F

## 2019-05-07 DIAGNOSIS — Z86.69 HISTORY OF CARPAL TUNNEL SYNDROME: ICD-10-CM

## 2019-05-07 DIAGNOSIS — M79.641 RIGHT HAND PAIN: ICD-10-CM

## 2019-05-07 PROCEDURE — 99283 EMERGENCY DEPT VISIT LOW MDM: CPT

## 2019-05-07 RX ORDER — METHYLPREDNISOLONE 4 MG/1
TABLET ORAL
Qty: 1 KIT | Refills: 0 | Status: SHIPPED | OUTPATIENT
Start: 2019-05-07 | End: 2019-11-04

## 2019-05-07 NOTE — ED PROVIDER NOTES
ED Provider Note    Chief Complaint:   Right hand pain.    HPI:  Kristian Saucedo is a 38 y.o. male who presents with chief complaint of right hand pain.  Symptoms been ongoing for the past 6 months, he was seen by a primary care physician and diagnosed with carpal tunnel syndrome.  His symptoms did improve with joint injections.  He was referred to a hand surgeon for carpal tunnel release in [place], but did not make that follow-up appointment.  Symptoms did improve somewhat with supportive care, but then worsened over the past 2 to 3 days.  He is not in [place] very often anymore, is not able to follow-up with his previous physician.  He has tried over-the-counter anti-inflammatories as well as Tylenol without significant improvement. At work, he does use a nail gun all day which exacerbates his symptoms.  He also has hand splints at home but no longer improve his symptoms.  He states he is not currently able to take any significant amount of time off of work to rest the hand.    Denies any pain or swelling to the right elbow, no forearm swelling, no upper arm pain.  No history of DVT or malignancy.    Review of Systems:  See HPI for pertinent positives and negatives.    Past Medical History:       Social History:  Social History     Social History Main Topics   • Smoking status: Former Smoker     Packs/day: 1.00     Quit date: 2/7/2019   • Smokeless tobacco: Never Used   • Alcohol use No   • Drug use: No   • Sexual activity: Not on file       Surgical History:   has a past surgical history that includes other abdominal surgery (01/23/2002); other orthopedic surgery (Left, 1999); sherri by laparoscopy (1/14/2019); and laparoscopic lysis of adhesions (1/14/2019).    Current Medications:  Home Medications     Reviewed by Hernan Gabriel R.N. (Registered Nurse) on 05/07/19 at 1141  Med List Status: Complete   Medication Last Dose Status        Patient Sloan Taking any Medications                       Allergies:  No  "Known Allergies    Physical Exam:  Vital Signs: /82   Pulse 65   Temp 36.7 °C (98.1 °F) (Temporal)   Resp 16   Ht 1.803 m (5' 11\")   Wt 106.5 kg (234 lb 12.6 oz)   SpO2 96%   BMI 32.75 kg/m²   Constitutional: Alert, no acute distress  HENT: Atraumatic  Neck: Normal range of motion  Cardiovascular: Right upper extremity warm, well perfused, normal capillary refill time in all 5 digits, 2+ radial pulse, no edema  Pulmonary: Normal work of breathing  Skin: Right hand with normal appearing overlying skin, no redness, no cellulitis, no evidence of abscess, no lacerations nor abrasions  Musculoskeletal: Right hand with normal  strength, normal sensation and motor function throughout radial, median, and ulnar nerve distribution, tapping the skin overlying the median nerve does somewhat reproduce symptoms, soft compartments throughout upper extremity  Neurologic: Right upper extremity with normal sensory and motor function      Medical records reviewed for continuity of care.  Discharge summary reviewed from 12/25/2018.  Patient seen with right upper quadrant abdominal pain.  HIDA scan confirmed gallbladder dyskinesia.  Outpatient cholecystectomy recommended.  This was performed 1/14/2019 by Dr. Roman.  No mention of right upper extremity numbness and tingling at these visits.    MDM:  Patient presents with history and physical exam consistent with carpal tunnel syndrome.  He has no significantly worsened symptoms from prior diagnosis, no new symptoms.  No risk factors for DVT or pulmonary embolism, no evidence of fasciitis, no cellulitis, no abscess, no compartment syndrome.    He is counseled to rest the wrist as much as possible, as well as to continue wearing his wrist splints.  He is referred to Dr. Beach, orthopedic surgeon on-call today, for outpatient clinic follow-up.  Will likely require surgical management as previously recommended.  Return precautions discussed including worsening pain, " swelling, redness, numbness, or any further concerns.    Blood pressure today is greater than 120/80, patient is instructed to follow up with primary care provider for blood pressure recheck.    Disposition:  Discharge home in stable condition    Final Impression:  1. Right hand pain    2. History of carpal tunnel syndrome        Current Outpatient Prescriptions   Medication Sig Dispense Refill   • MethylPREDNISolone (MEDROL DOSEPAK) 4 MG Tablet Therapy Pack Use as directed 1 Kit 0       Electronically signed by: Echo Rodriguez, 5/7/2019 2:38 PM

## 2019-05-07 NOTE — DISCHARGE INSTRUCTIONS
Please call the orthopedic clinic listed above.  Return to the emergency department if you develop new or worsening symptoms including worsening hand pain, numbness, tingling, difficulty grasping objects, swelling, or any further concerns.  When you contact the orthopedic clinic, please let them know you are seen in the emergency department today and require a follow-up appointment.

## 2019-05-07 NOTE — ED TRIAGE NOTES
Chief Complaint   Patient presents with   • Tingling     entire right hand for several months. Intermittently has pain and tinlgling into forearm     Ambulatory to triage for above. Equal . VSS. Explained triage process, to waiting room. Asked to inform RN if questions or concerns arise.

## 2019-05-07 NOTE — ED NOTES
Pt given d/c instructions, f/u info and RX x 1 with verbal understanding.  Pt ambulatory from the ED w/ steady gait.  All belongings in possession on discharge.  Pt escorted to the lobby by RN.

## 2019-07-26 ENCOUNTER — HOSPITAL ENCOUNTER (EMERGENCY)
Facility: MEDICAL CENTER | Age: 39
End: 2019-07-26
Attending: EMERGENCY MEDICINE
Payer: COMMERCIAL

## 2019-07-26 ENCOUNTER — NON-PROVIDER VISIT (OUTPATIENT)
Dept: OCCUPATIONAL MEDICINE | Facility: CLINIC | Age: 39
End: 2019-07-26
Payer: COMMERCIAL

## 2019-07-26 VITALS
TEMPERATURE: 97.3 F | WEIGHT: 254.63 LBS | SYSTOLIC BLOOD PRESSURE: 135 MMHG | OXYGEN SATURATION: 99 % | RESPIRATION RATE: 16 BRPM | HEIGHT: 70 IN | BODY MASS INDEX: 36.45 KG/M2 | DIASTOLIC BLOOD PRESSURE: 80 MMHG | HEART RATE: 95 BPM

## 2019-07-26 DIAGNOSIS — S39.012A STRAIN OF LUMBAR REGION, INITIAL ENCOUNTER: ICD-10-CM

## 2019-07-26 DIAGNOSIS — Z02.89 ENCOUNTER FOR OCCUPATIONAL HEALTH ASSESSMENT: ICD-10-CM

## 2019-07-26 LAB
AMP AMPHETAMINE: NORMAL
BREATH ALCOHOL COMMENT: NORMAL
COC COCAINE: NORMAL
INT CON NEG: NORMAL
INT CON POS: NORMAL
MET METHAMPHETAMINES: NORMAL
OPI OPIATES: NORMAL
PCP PHENCYCLIDINE: NORMAL
POC BREATHALIZER: 0 PERCENT (ref 0–0.01)
POC DRUG COMMENT 753798-OCCUPATIONAL HEALTH: NORMAL
THC: NORMAL

## 2019-07-26 PROCEDURE — 80305 DRUG TEST PRSMV DIR OPT OBS: CPT | Performed by: PREVENTIVE MEDICINE

## 2019-07-26 PROCEDURE — 82075 ASSAY OF BREATH ETHANOL: CPT | Performed by: PREVENTIVE MEDICINE

## 2019-07-26 PROCEDURE — 96372 THER/PROPH/DIAG INJ SC/IM: CPT

## 2019-07-26 PROCEDURE — 99026 IN-HOSPITAL ON CALL SERVICE: CPT | Performed by: PREVENTIVE MEDICINE

## 2019-07-26 PROCEDURE — 99284 EMERGENCY DEPT VISIT MOD MDM: CPT

## 2019-07-26 PROCEDURE — 700111 HCHG RX REV CODE 636 W/ 250 OVERRIDE (IP): Performed by: EMERGENCY MEDICINE

## 2019-07-26 PROCEDURE — 700102 HCHG RX REV CODE 250 W/ 637 OVERRIDE(OP): Performed by: EMERGENCY MEDICINE

## 2019-07-26 PROCEDURE — A9270 NON-COVERED ITEM OR SERVICE: HCPCS | Performed by: EMERGENCY MEDICINE

## 2019-07-26 RX ORDER — KETOROLAC TROMETHAMINE 30 MG/ML
60 INJECTION, SOLUTION INTRAMUSCULAR; INTRAVENOUS ONCE
Status: COMPLETED | OUTPATIENT
Start: 2019-07-26 | End: 2019-07-26

## 2019-07-26 RX ORDER — PREDNISONE 20 MG/1
60 TABLET ORAL DAILY
Status: DISCONTINUED | OUTPATIENT
Start: 2019-07-26 | End: 2019-07-26 | Stop reason: HOSPADM

## 2019-07-26 RX ORDER — IBUPROFEN 800 MG/1
800 TABLET ORAL EVERY 8 HOURS PRN
Qty: 30 TAB | Refills: 0 | Status: SHIPPED | OUTPATIENT
Start: 2019-07-26 | End: 2022-01-05

## 2019-07-26 RX ORDER — CYCLOBENZAPRINE HCL 10 MG
10 TABLET ORAL 3 TIMES DAILY PRN
Qty: 15 TAB | Refills: 0 | Status: SHIPPED | OUTPATIENT
Start: 2019-07-26 | End: 2019-11-04

## 2019-07-26 RX ORDER — CYCLOBENZAPRINE HCL 10 MG
10 TABLET ORAL ONCE
Status: COMPLETED | OUTPATIENT
Start: 2019-07-26 | End: 2019-07-26

## 2019-07-26 RX ORDER — HYDROMORPHONE HYDROCHLORIDE 1 MG/ML
1 INJECTION, SOLUTION INTRAMUSCULAR; INTRAVENOUS; SUBCUTANEOUS ONCE
Status: COMPLETED | OUTPATIENT
Start: 2019-07-26 | End: 2019-07-26

## 2019-07-26 RX ORDER — PREDNISONE 20 MG/1
40 TABLET ORAL DAILY
Qty: 8 TAB | Refills: 0 | Status: SHIPPED | OUTPATIENT
Start: 2019-07-26 | End: 2019-07-30

## 2019-07-26 RX ADMIN — PREDNISONE 60 MG: 20 TABLET ORAL at 08:22

## 2019-07-26 RX ADMIN — KETOROLAC TROMETHAMINE 60 MG: 30 INJECTION, SOLUTION INTRAMUSCULAR at 08:23

## 2019-07-26 RX ADMIN — CYCLOBENZAPRINE HYDROCHLORIDE 10 MG: 10 TABLET, FILM COATED ORAL at 08:22

## 2019-07-26 RX ADMIN — HYDROMORPHONE HYDROCHLORIDE 1 MG: 1 INJECTION, SOLUTION INTRAMUSCULAR; INTRAVENOUS; SUBCUTANEOUS at 08:23

## 2019-07-26 ASSESSMENT — ENCOUNTER SYMPTOMS
WEAKNESS: 0
FEVER: 0
CHILLS: 0
BACK PAIN: 1
NAUSEA: 0
TINGLING: 1
ABDOMINAL PAIN: 0
FOCAL WEAKNESS: 0
VOMITING: 0
CONSTIPATION: 0
HEADACHES: 0
DIARRHEA: 0
FALLS: 0

## 2019-07-26 ASSESSMENT — PAIN SCALES - WONG BAKER: WONGBAKER_NUMERICALRESPONSE: HURTS A WHOLE LOT

## 2019-07-26 NOTE — ED PROVIDER NOTES
ED Provider Note    ED Provider Note    Primary care provider: Geronimo Fang M.D.  Means of arrival: POV  History obtained from: Patient  History limited by: None    CHIEF COMPLAINT  Chief Complaint   Patient presents with   • Low Back Pain     to triage by wheelchair c/o pain in lower back after throwing 40-80 lbs tires while at work, then felt pop in his lower back. describes pain as sharp/tightness/spams in his back. denies numbness/tingling.       HPI  Jobubba Saucedo is a 38 y.o. male who presents to the Emergency Department Brought in by his supervisor from work.  Patient was doing his normal work activities which involves moving, throwing car tires.  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  This involves repetitive lifting twisting throwing.  Early during the day this morning, he suddenly felt onset of pain which cause shortness of breath, at the onset of pain, this is since resolved.  He denies having any bowel or bladder and incontinence or retention.  He was able to continue with the pain for a few minutes but then his supervisor noticed that he seemed to be grimacing and advised he stop and then brought him here for evaluation.  Patient is otherwise been in his normal state of health.  No fevers.  No vomiting.  He initially had some tingling though he denies numbness, to the lateral aspect of his left leg, pain symptoms are primarily located to the left side of his back.  He is otherwise healthy.  Denies drug or alcohol use.  No past medical history other than a hospitalization late last year for flu, pneumonia and cholecystectomy.    REVIEW OF SYSTEMS  Review of Systems   Constitutional: Negative for chills and fever.   HENT: Negative for congestion.    Cardiovascular: Negative for chest pain.   Gastrointestinal: Negative for abdominal pain, constipation, diarrhea, nausea and vomiting.   Genitourinary: Negative for dysuria,  "frequency and urgency.   Musculoskeletal: Positive for back pain. Negative for falls.   Neurological: Positive for tingling. Negative for focal weakness, weakness and headaches.   All other systems reviewed and are negative.      PAST MEDICAL HISTORY   None reported.    SURGICAL HISTORY   has a past surgical history that includes other abdominal surgery (01/23/2002); other orthopedic surgery (Left, 1999); sherri by laparoscopy (1/14/2019); and laparoscopic lysis of adhesions (1/14/2019).    SOCIAL HISTORY  Social History   Substance Use Topics   • Smoking status: Former Smoker     Packs/day: 1.00     Quit date: 2/7/2019   • Smokeless tobacco: Never Used   • Alcohol use No      History   Drug Use No       FAMILY HISTORY  History reviewed. No pertinent family history.    CURRENT MEDICATIONS  Home Medications     Reviewed by Emily Giron R.N. (Registered Nurse) on 07/26/19 at 0714  Med List Status: Complete   Medication Last Dose Status   MethylPREDNISolone (MEDROL DOSEPAK) 4 MG Tablet Therapy Pack not taking Active                ALLERGIES  No Known Allergies    PHYSICAL EXAM  VITAL SIGNS: /85   Pulse 93   Temp 36.3 °C (97.4 °F) (Temporal)   Resp 16   Ht 1.778 m (5' 10\")   Wt 115.5 kg (254 lb 10.1 oz)   SpO2 99%   BMI 36.54 kg/m²   Vitals reviewed.  Constitutional: Patient is oriented to person, place, and time. Appears well-developed and well-nourished.  Mild distress.    Head: Normocephalic and atraumatic.   Mouth/Throat: Oropharynx is clear and moist  Eyes: Conjunctivae are normal.   Neck: Normal range of motion.   Cardiovascular: Normal rate, regular rhythm and normal heart sounds. Normal peripheral pulses, bilateral lowerextremity.  Pulmonary/Chest: Effort normal and breath sounds normal. No respiratory distress, no wheezes, rhonchi, or rales.   Abdominal: Soft. Bowel sounds are normal. There is no tenderness. No rebound or guarding, or peritoneal signs. No CVA tenderness.  Musculoskeletal: No edema " and no tenderness, except to the left lateral lumbar paraspinal muscles and left SI joint.  No midline tenderness no right-sided pain..   Neurological: No focal deficits. No motor deficits to the lower extremities.  Flexion of his hip and dorsiflexion causes pain to his left low back.  Skin: Skin is warm and dry. No erythema. No pallor.   Psychiatric: Patient has a normal mood and affect.     COURSE & MEDICAL DECISION MAKING  Pertinent Labs & Imaging studies reviewed. (See chart for details)    Obtained and reviewed past medical records.  Patient's last encounter was in May of this year he was seen for right hand pain with a history of carpal tunnel syndrome.  Patient was admitted to the hospital in December 2018 with pneumonia, patient also noted to have cholelithiasis with poor EF of his gallbladder and underwent cholecystectomy.  He was also diagnosed with influenza A at the time.    7:58 AM - Patient seen and examined at bedside.  This is a pleasant 38-year-old male who presents with pain to his left low back which occurred during the course of normal work events for him although this from what he describes, is quite strenuous, lifting, bending, repetitive motion.  He has no concerning neurologic deficits at this time.  No bowel or bladder incontinence or retention.  I do not suspect that there is need for imaging particularly MRI at this time.  No red flag symptoms to suggest infectious etiology or cauda equina syndrome.  I do think he has likely strained 1 of the ligaments/muscles in his lumbar spine.  He will be treated with anti-inflammatories, pain medication, muscle relaxers and steroids.     10:10 AM, patient's reevaluated the bedside.  He is feeling markedly better.  Now resting comfortably with his left hip, flexed and crossed over his right knee.  States he is been up in the exam room and is feeling much better.  States that he started relaxing in the muscles in his back about 20 minutes ago.  At this  time, I feel he can safely be discharged home.  We discharged with a short course of steroids, anti-inflammatories and a muscle relaxer.  He is advised to follow-up with the occupational health clinic associated with his workplace.  I have advised being off work for the next few days and upon to return to work on Monday, no heavy lifting.  Patient is agreeable to this plan of care.  He is feeling markedly better.  He will be discharged home in stable condition.  He is given strict return precautions.    Patient's discharged home in stable condition.    FINAL IMPRESSION  1. Strain of lumbar region, initial encounter

## 2019-07-26 NOTE — ED NOTES
MD at bedside prior to d/c. Pt given d/c instruction and verbalized understanding. 3 rx's given. Pt ambulatory to lobby, gait steady. Pt took all belongings from room.

## 2019-07-26 NOTE — ED NOTES
Pt brought back from lobby via wheelchair, able to ambulate from wheelchair to gurney with steady gait.    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 Pt states tingling to L leg only. Denies any pain radiating down legs. Pt denies any bowel/bladder changes.     Pt a/o x4, speaking in full sentences.

## 2019-07-26 NOTE — ED TRIAGE NOTES
Chief Complaint   Patient presents with   • Low Back Pain     to triage by wheelchair c/o pain in lower back after throwing 40-80 lbs tires while at work, then felt pop in his lower back. describes pain as sharp/tightness/spams in his back. denies numbness/tingling.     Educated on triage process. Instructed to notify staff for any changes.

## 2019-07-26 NOTE — LETTER
"  FORM C-4:  EMPLOYEE’S CLAIM FOR COMPENSATION/ REPORT OF INITIAL TREATMENT  EMPLOYEE’S CLAIM - PROVIDE ALL INFORMATION REQUESTED   First Name  Kristian Last Name  Lewis Birthdate             Age  1980 38 y.o. 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 (if applicable)  THROWING TRUCK TIRES    How did this injury or occupational disease occur? Be specific and answer in detail. Use additional sheet if necessary)  THROWING TRUCK TIRES & LOADING ON TRUCK   If you believe that you have an occupational disease, when did you first have knowledge of the disability and it relationship to your employment?  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  I hereby authorize any physician, chiropractor, surgeon, practitioner, or other person, any hospital, including [facility] or [facility] hospital, any medical service organization, any insurance company, or other institution or organization to release to each other, any medical or other information, including benefits paid or payable, pertinent to this injury or disease, except information relative to diagnosis, treatment and/or counseling for AIDS, psychological conditions, alcohol or controlled substances, for which I must give specific authorization.  A Photostat of this authorization shall be as valid as the original.   Date  07/26/2019 Place  [employer] [facility] Employee’s Signature   THIS REPORT MUST BE COMPLETED AND MAILED WITHIN 3 WORKING DAYS OF TREATMENT   Place  [facility], EMERGENCY DEPT  Name of Facility   [facility]   Date  7/26/2019 Diagnosis  (S39.012A) Strain of lumbar region, initial encounter Is there evidence the injured employee was under the influence of alcohol and/or another controlled substance at the time of accident?   Hour  10:18 AM Description of Injury or Disease  Strain of lumbar region, initial encounter No   Treatment  Steroids, anti-inflammatories, pain medication and muscle relaxer  Have you advised the patient to remain off work five days or more?         No   X-Ray Findings      If Yes   From Date    To Date      From information given by the employee, together with medical evidence, can you directly connect this injury or occupational disease as job incurred?  Yes If No, is the employee capable of: Full Duty  No Modified Duty  Yes   Is additional medical care by a physician indicated?  Yes  Comments:Follow-up with the occupational health clinic associated with your workplace. If Modified Duty,  "Specify any Limitations / Restrictions  Would recommend off and rest for 2 days and upon return to work on Monday, no heavy lifting until symptoms resolved.     Do you know of any previous injury or disease contributing to this condition or occupational disease?  No   Date  7/26/2019 Print Doctor’s Name  Cookie Washington certify the employer’s copy of this form was mailed on:   Address  [address] [place] [id number] [phone] Insurer’s Use Only   [facility]  [id number]    Provider’s Tax ID Number  [id number] Telephone  Dept: [phone]    Doctor’s Signature  e-COOKIE Em D.O. Degree   MD    Original - TREATING PHYSICIAN OR CHIROPRACTOR   Pg 2-Insurer/TPA   Pg 3-Employer   Pg 4-Employee                                                                                                  Form C-4 (rev01/03)     BRIEF DESCRIPTION OF RIGHTS AND BENEFITS  (Pursuant to [place] 616C.050)  Notice of Injury or Occupational Disease (Incident Report Form C-1): If an injury or occupational disease (OD) arises out of and in the course of employment, you must provide written notice to your employer as soon as practicable, but no later than 7 days after the accident or OD. Your employer shall maintain a sufficient supply of the required forms.  Claim for Compensation (Form C-4): If medical treatment is sought, the form C-4 is available at the place of initial treatment. A completed \"Claim for Compensation\" (Form C-4) must be filed within 90 days after an accident or OD. The treating physician or chiropractor must, within 3 working days after treatment, complete and mail to the employer, the employer's insurer and third-party , the Claim for Compensation.  Medical Treatment: If you require medical treatment for your on-the-job injury or OD, you may be required to select a physician or chiropractor from a list provided by your workers’ compensation insurer, if it has contracted with an " Organization for Managed Care (MCO) or Preferred Provider Organization (PPO) or providers of health care. If your employer has not entered into a contract with an MCO or PPO, you may select a physician or chiropractor from the Panel of Physicians and Chiropractors. Any medical costs related to your industrial injury or OD will be paid by your insurer.  Temporary Total Disability (TTD): If your doctor has certified that you are unable to work for a period of at least 5 consecutive days, or 5 cumulative days in a 20-day period, or places restrictions on you that your employer does not accommodate, you may be entitled to TTD compensation.  Temporary Partial Disability (TPD): If the wage you receive upon reemployment is less than the compensation for TTD to which you are entitled, the insurer may be required to pay you TPD compensation to make up the difference. TPD can only be paid for a maximum of 24 months.  Permanent Partial Disability (PPD): When your medical condition is stable and there is an indication of a PPD as a result of your injury or OD, within 30 days, your insurer must arrange for an evaluation by a rating physician or chiropractor to determine the degree of your PPD. The amount of your PPD award depends on the date of injury, the results of the PPD evaluation and your age and wage.  Permanent Total Disability (PTD): If you are medically certified by a treating physician or chiropractor as permanently and totally disabled and have been granted a PTD status by your insurer, you are entitled to receive monthly benefits not to exceed 66 2/3% of your average monthly wage. The amount of your PTD payments is subject to reduction if you previously received a PPD award.  Vocational Rehabilitation Services: You may be eligible for vocational rehabilitation services if you are unable to return to the job due to a permanent physical impairment or permanent restrictions as a result of your injury or occupational  disease.  Transportation and Per Henna Reimbursement: You may be eligible for travel expenses and per henna associated with medical treatment.  Reopening: You may be able to reopen your claim if your condition worsens after claim closure.  Appeal Process: If you disagree with a written determination issued by the insurer or the insurer does not respond to your request, you may appeal to the Department of Administration, , by following the instructions contained in your determination letter. You must appeal the determination within 70 days from the date of the determination letter at [address], [address], [place], [place] [address], or [address], [address], [place], [place] [address]. If you disagree with the  decision, you may appeal to the Department of Administration, . You must file your appeal within 30 days from the date of the  decision letter at [address], [address], [place], [place] [address], or [address], [place] [address]. If you disagree with a decision of an , you may file a petition for judicial review with the District Court. You must do so within 30 days of the Appeal Officer’s decision. You may be represented by an  at your own expense or you may contact the [facility] for possible representation.  [place]  for Injured Workers (NAIW): If you disagree with a  decision, you may request that NAIW represent you without charge at an  Hearing. For information regarding denial of benefits, you may contact the [facility] at: [address], [address], [place] [address], [phone], or [address], [place] [address], [phone]  To File a Complaint with the Division: If you wish to file a complaint with the  of the Division of Industrial Relations (DIR), please contact the Workers’  Compensation Section, [address], [address], [place], [place] [address], telephone [phone], or [address], [address], [place], [place] [address], telephone [phone].  For assistance with Workers’ Compensation Issues: you may contact the Office of the Governor Consumer Health Assistance, [address] [place] [address], [address], [place], [place] [address], Toll Free 1-292.639.8403, Web site: http://govcha.[facility].nv., E-mail [email or web address].[email or web address].                                                                                                                                                                               __________________________________________________________________                                    _________________            Employee Name / Signature                                                                                                                            Date                                       D-2 (rev. 10/07)

## 2019-11-04 ENCOUNTER — HOSPITAL ENCOUNTER (EMERGENCY)
Facility: MEDICAL CENTER | Age: 39
End: 2019-11-04
Attending: EMERGENCY MEDICINE
Payer: COMMERCIAL

## 2019-11-04 VITALS
TEMPERATURE: 97.2 F | WEIGHT: 272 LBS | DIASTOLIC BLOOD PRESSURE: 68 MMHG | BODY MASS INDEX: 38.94 KG/M2 | HEART RATE: 68 BPM | HEIGHT: 70 IN | SYSTOLIC BLOOD PRESSURE: 116 MMHG | OXYGEN SATURATION: 96 % | RESPIRATION RATE: 16 BRPM

## 2019-11-04 DIAGNOSIS — M54.42 CHRONIC BILATERAL LOW BACK PAIN WITH LEFT-SIDED SCIATICA: ICD-10-CM

## 2019-11-04 DIAGNOSIS — G89.29 CHRONIC BILATERAL LOW BACK PAIN WITH LEFT-SIDED SCIATICA: ICD-10-CM

## 2019-11-04 PROCEDURE — 96372 THER/PROPH/DIAG INJ SC/IM: CPT

## 2019-11-04 PROCEDURE — 700111 HCHG RX REV CODE 636 W/ 250 OVERRIDE (IP): Performed by: EMERGENCY MEDICINE

## 2019-11-04 PROCEDURE — 99283 EMERGENCY DEPT VISIT LOW MDM: CPT

## 2019-11-04 RX ORDER — MORPHINE SULFATE 10 MG/ML
10 INJECTION, SOLUTION INTRAMUSCULAR; INTRAVENOUS ONCE
Status: COMPLETED | OUTPATIENT
Start: 2019-11-04 | End: 2019-11-04

## 2019-11-04 RX ORDER — HYDROMORPHONE HYDROCHLORIDE 1 MG/ML
1 INJECTION, SOLUTION INTRAMUSCULAR; INTRAVENOUS; SUBCUTANEOUS ONCE
Status: DISCONTINUED | OUTPATIENT
Start: 2019-11-04 | End: 2019-11-04 | Stop reason: HOSPADM

## 2019-11-04 RX ADMIN — MORPHINE SULFATE 10 MG: 10 INJECTION INTRAVENOUS at 19:49

## 2019-11-04 RX ADMIN — MORPHINE SULFATE 10 MG: 10 INJECTION INTRAVENOUS at 18:37

## 2019-11-04 NOTE — LETTER
Emergency Services     November 4, 2019    Patient: Kristian Saucedo   YOB: 1980   Date of Visit: 11/4/2019       To Whom It May Concern:    Kristian Saucedo was seen and treated in our emergency department on 11/4/2019. He may return to work on 11/8/19.    Sincerely,     JUDI PAEZ D.O.  [facility], EMERGENCY DEPT  Dept: [phone]

## 2019-11-04 NOTE — LETTER
[facility] [place] [place], EMERGENCY DEPT   [address], [place] [address]  Phone: Dept: [phone] - Fax:        [facility] Progress Report and Disability Certification  Date of Service: 11/4/2019   No Show:  No  Date / Time of Next Visit: 11/8/2019   Claim Information   Patient Name: Kristian Saucedo  Claim Number:     Employer: [employer]  Date of Injury: 7/26/2019     Insurer / TPA: Medicaid Ffs ID / SSN:    Occupation: TEMPWORKER  Diagnosis: The encounter diagnosis was Chronic bilateral low back pain with left-sided sciatica.    Medical Information   Related to Industrial Injury? Yes ***   Subjective Complaints:  Low back pain   Objective Findings: Tenderness across lower back   Pre-Existing Condition(s):     Assessment:   Initial Visit    Status: Additional Care Required  Comments:Patient to follow-up with back specialist  Permanent Disability:  Comments:Unknown at this time    Plan:   Comments:Patient to follow-up with back specialist    Diagnostics:      Comments:       Disability Information   Status: Temporarily Totally Disabled    From:  11/4/2019  Through: 11/8/2019 Restrictions are: Temporary   Physical Restrictions   Sitting:    Standing:    Stooping:    Bending:      Squatting:    Walking:    Climbing:    Pushing:      Pulling:    Other:    Reaching Above Shoulder (L):   Reaching Above Shoulder (R):       Reaching Below Shoulder (L):    Reaching Below Shoulder (R):      Not to exceed Weight Limits   Carrying(hrs):   Weight Limit(lb):   Lifting(hrs):   Weight  Limit(lb):     Comments:      Repetitive Actions   Hands: i.e. Fine Manipulations from Grasping:     Feet: i.e. Operating Foot Controls:     Driving / Operate Machinery:     Physician Name: Judi Murphy Physician Signature: JUDI Awad D.O. e-Signature:  , Medical Director   Clinic Name / Location: [facility], EMERGENCY  DEPT  [address] [facility] [address]  [phone]     Clinic Phone Number: Dept: [phone]   Appointment Time:  Visit Start Time:    Check-In Time:  4:58 PM Visit Discharge Time:    Original-Treating Physician or Chiropractor    Page 2-Insurer/TPA    Page 3-Employer    Page 4-Employee

## 2019-11-04 NOTE — LETTER
[facility], EMERGENCY DEPT   [address] [place], [place] [address]  Phone: Dept: [phone] - Fax:        [facility] Progress Report and Disability Certification  Date of Service: 11/4/2019   No Show:  No  Date / Time of Next Visit: 11/8/2019   Claim Information   Patient Name: Kristian Saucedo  Claim Number:     Employer: [employer]  Date of Injury: 7/26/2019     Insurer / TPA: [employer] Claims ID / SSN: [id number]   Occupation: TEMPWORKER  Diagnosis: The encounter diagnosis was Chronic bilateral low back pain with left-sided sciatica.    Medical Information   Related to Industrial Injury? Yes    Subjective Complaints:  Low back pain   Objective Findings: Tenderness across lower back   Pre-Existing Condition(s):     Assessment:   Initial Visit    Status: Additional Care Required  Comments:Patient to follow-up with back specialist  Permanent Disability:  Comments:Unknown at this time    Plan:   Comments:Patient to follow-up with back specialist    Diagnostics:      Comments:       Disability Information   Status: Temporarily Totally Disabled    From:  11/4/2019  Through: 11/8/2019 Restrictions are: Temporary   Physical Restrictions   Sitting:    Standing:    Stooping:    Bending:      Squatting:    Walking:    Climbing:    Pushing:      Pulling:    Other:    Reaching Above Shoulder (L):   Reaching Above Shoulder (R):       Reaching Below Shoulder (L):    Reaching Below Shoulder (R):      Not to exceed Weight Limits   Carrying(hrs):   Weight Limit(lb):   Lifting(hrs):   Weight  Limit(lb):     Comments:      Repetitive Actions   Hands: i.e. Fine Manipulations from Grasping:     Feet: i.e. Operating Foot Controls:     Driving / Operate Machinery:     Physician Name: Judi Murphy Physician Signature: JUDI Awad D.O. e-Signature:  , Medical Director   Clinic Name / Location: [facility],  EMERGENCY DEPT  [address] [facility] [address]  [phone]     Clinic Phone Number: Dept: [phone]   Appointment Time:  Visit Start Time:    Check-In Time:  4:58 PM Visit Discharge Time:    Original-Treating Physician or Chiropractor    Page 2-Insurer/TPA    Page 3-Employer    Page 4-Employee

## 2019-11-04 NOTE — LETTER
[facility], EMERGENCY DEPT   [address] [place], [place] [address]  Phone: Dept: [phone] - Fax:        [facility] Progress Report and Disability Certification  Date of Service: 11/4/2019   No Show:  No  Date / Time of Next Visit:     Claim Information   Patient Name: Kristian Saucedo  Claim Number:     Employer: [employer]  Date of Injury: 7/26/2019     Insurer / TPA: Medicaid Ffs ID / SSN: [id number]    Occupation: TEMPWORKER  Diagnosis: The encounter diagnosis was Chronic bilateral low back pain with left-sided sciatica.    Medical Information   Related to Industrial Injury?   ***   Subjective Complaints:      Objective Findings:     Pre-Existing Condition(s):     Assessment:        Status:    Permanent Disability:     Plan:      Diagnostics:      Comments:       Disability Information   Status:      From:     Through:   Restrictions are:     Physical Restrictions   Sitting:    Standing:    Stooping:    Bending:      Squatting:    Walking:    Climbing:    Pushing:      Pulling:    Other:    Reaching Above Shoulder (L):   Reaching Above Shoulder (R):       Reaching Below Shoulder (L):    Reaching Below Shoulder (R):      Not to exceed Weight Limits   Carrying(hrs):   Weight Limit(lb):   Lifting(hrs):   Weight  Limit(lb):     Comments:      Repetitive Actions   Hands: i.e. Fine Manipulations from Grasping:     Feet: i.e. Operating Foot Controls:     Driving / Operate Machinery:     Physician Name: Aurelio Murphy Physician Signature:   e-Signature:  , Medical Director   Clinic Name / Location: [facility], EMERGENCY DEPT  [address] [place] [id number] [phone]     Clinic Phone Number: Dept: [phone]   Appointment Time:  Visit Start Time:    Check-In Time:  4:58 PM Visit Discharge Time:    Original-Treating Physician or Chiropractor    Page 2-Insurer/TPA    Page 3-Employer    Page  4-Employee

## 2019-11-05 NOTE — ED TRIAGE NOTES
Pt to triage .  Chief Complaint   Patient presents with   • Low Back Pain     pt c/o chronic back pain after a work injury in july but now having increased pain with radiation to left leg. pt has epidural scheduled in 3 wks but came to ED for acute pain today    • Muscle Spasms

## 2019-11-05 NOTE — ED PROVIDER NOTES
"ED Provider Note    CHIEF COMPLAINT  Chief Complaint   Patient presents with   • Low Back Pain     pt c/o chronic back pain after a work injury in july but now having increased pain with radiation to left leg. pt has epidural scheduled in 3 wks but came to ED for acute pain today    • Muscle Spasms        HPI    Primary care provider: Geronimo Fang M.D.   History obtained from: Patient  History limited by: None     Kristian Saucedo is a 39 y.o. male who presents to the ED complaining of low back pain since a work injury in July.  Patient states that he was throwing heavy tires when he felt a pop in his lower back.  Patient states that he was seen in this ED after that injury in July and has been following up with [facility] \"just about every week\" since the injury.  He is also being seen by a specialist and has an upcoming epidural scheduled because \"they have tried everything and nothing has worked.\"  Patient states that he has been put on steroid and various muscle relaxants as well as lidocaine patches and pain medicine without any improvement.  Patient states that he had an MRI performed about 1 month ago showing \"multiple bulges.\"  He has had radiation down to his left lower calf but now it is radiating down into his left toes and he is concerned that his condition is getting worse.  He denies any recent injuries.  He denies fever/chills/nausea/vomiting/diarrhea/constipation/dysuria/hematuria/saddle anesthesia/incontinence.  He denies pain anywhere else including chest pain or abdominal pain.  He denies any shortness of breath or difficulty breathing.  Patient states that he tried to call his specialist and was told to come to the ED.  He tried to go to [facility] and was told that they would not see him because he was being seen by a specialist.    REVIEW OF SYSTEMS  Please see HPI for pertinent positives/negatives.  All other systems reviewed and are negative.     PAST MEDICAL HISTORY  No past medical " "history on file.     SURGICAL HISTORY  Past Surgical History:   Procedure Laterality Date   • STEPHAN BY LAPAROSCOPY  2019    Procedure: STEPHAN BY LAPAROSCOPY;  Surgeon: Elias Aadm M.D.;  Location: SURGERY [facility];  Service: General   • LAPAROSCOPIC LYSIS OF ADHESIONS  2019    Procedure: LAPAROSCOPIC LYSIS OF ADHESIONS;  Surgeon: Elias Adam M.D.;  Location: SURGERY [facility];  Service: General   • OTHER ABDOMINAL SURGERY  2002    removal of some small intestine   • OTHER ORTHOPEDIC SURGERY Left     plates and screws        SOCIAL HISTORY  Social History     Tobacco Use   • Smoking status: Former Smoker     Packs/day: 1.00     Last attempt to quit: 2019     Years since quittin.7   • Smokeless tobacco: Never Used   Substance and Sexual Activity   • Alcohol use: No   • Drug use: No   • Sexual activity: Not on file        FAMILY HISTORY  No family history on file.     CURRENT MEDICATIONS  Home Medications     Reviewed by Pat Arnold R.N. (Registered Nurse) on 19 at 1707  Med List Status: Partial   Medication Last Dose Status   ibuprofen (MOTRIN) 800 MG Tab prn Active                 ALLERGIES  No Known Allergies     PHYSICAL EXAM  VITAL SIGNS: /68   Pulse 68   Temp 36.2 °C (97.2 °F) (Temporal)   Resp 16   Ht 1.778 m (5' 10\")   Wt 123.4 kg (272 lb)   SpO2 96%   BMI 39.03 kg/m²  @SUMMER[[id number]::@     Pulse ox interpretation: 94% I interpret this pulse ox as normal     Constitutional: Well developed, well nourished, alert in no apparent distress, nontoxic appearance    HENT: No external signs of trauma, normocephalic, oropharynx moist and clear, nose normal    Eyes: PERRL, conjunctiva without erythema, no discharge, no icterus    Neck: Soft and supple, trachea midline, no stridor, no tenderness, no LAD, no JVD, good ROM    Cardiovascular: Regular rate and rhythm, no murmurs/rubs/gallops, strong distal pulses and good perfusion    Thorax & Lungs: No " respiratory distress, CTAB   Abdomen: Soft, nontender, nondistended, no guarding, no rebound, normal BS    Back: Diffuse tenderness across lumbar region bilaterally without point tenderness to palpation, limited range of motion due to pain, positive straight leg raising bilaterally, 5/5 strength equal bilateral lower extremities, 0/4 DTRs bilateral lower extremities, sensation intact to touch throughout  Extremities: No cyanosis, no edema, no gross deformity, good ROM, no tenderness, intact distal pulses with brisk cap refill    Skin: Warm, dry, no pallor/cyanosis, no rash noted    Lymphatic: No lymphadenopathy noted    Neuro: A/O times 3, no focal deficits noted    Psychiatric: Cooperative, normal mood and affect, normal judgement, appropriate for clinical situation        DIAGNOSTIC STUDIES / PROCEDURES        LABS  All labs reviewed by me.     Results for orders placed or performed in visit on 07/26/19   POCT Breath Alcohol Test   Result Value Ref Range    POC Breathalizer 0.0 0.00 - 0.01 Percent    Breath Alcohol Comment     POCT 6 Panel Urine Drug Screen   Result Value Ref Range    AMPHETAMINE      POC THC      COCAINE      OPIATES      PHENCYCLIDINE      METHAMPHETAMINES      POC Urine Drug Screen Comment NEG     Internal Control Positive Valid     Internal Control Negative Valid         RADIOLOGY  The radiologist's interpretation of all radiological studies have been reviewed by me.     No orders to display          COURSE & MEDICAL DECISION MAKING  Nursing notes, VS, PMSFHx reviewed in chart.     Review of past medical records shows the patient was last seen in this ED July 26, 2019 for low back pain due to injury at work.      Differential diagnoses considered include but are not limited to: Strain/sprain, DDD, herniated disc, radiculopathy, sciatica       History and physical exam as above.  Patient with worsening of his chronic low back pain without evidence for acute cord syndrome/cauda equina.  Low  clinical suspicion at this time for more serious acute pathology such as abscess/hematoma or dissection given the history/exam/findings and no indications for emergent imaging at this time.  Patient reports Dilaudid did not help his pain in the past and therefore he was given morphine in the ED.  After the first shot, his pain improved but returned later and he was given another shot of morphine.  He reports his pain was much better after the second shot and he was ready to go home.  He requested a note to be off work until being seen by his specialist on Thursday.  Return to ED precautions were given.  Patient verbalized understanding and agreed with plan of care with no further questions or concerns.      The patient is referred to a primary physician for blood pressure management, diabetic screening, and for all other preventative health concerns.       FINAL IMPRESSION  1. Chronic bilateral low back pain with left-sided sciatica Active          DISPOSITION  Patient will be discharged home in stable condition.       FOLLOW UP  Please follow-up with your doctor    Call in 1 day      [facility], Emergency Dept  [address] [place] [id number] [phone]    If symptoms worsen         OUTPATIENT MEDICATIONS  Discharge Medication List as of 11/4/2019  8:48 PM             Electronically signed by: Aurelio Murphy, 11/4/2019 5:52 PM      Portions of this record were made with voice recognition software.  Despite my review, spelling/grammar/context errors may still remain.  Interpretation of this chart should be taken in this context.

## 2019-11-05 NOTE — ED NOTES
Family member up to nurses station. Requesting re-eval by ERP. Stating that pts pain is returning. ERP notified.

## 2020-02-07 ENCOUNTER — OFFICE VISIT (OUTPATIENT)
Dept: URGENT CARE | Facility: PHYSICIAN GROUP | Age: 40
End: 2020-02-07
Payer: MEDICAID

## 2020-02-07 VITALS
SYSTOLIC BLOOD PRESSURE: 128 MMHG | HEIGHT: 71 IN | HEART RATE: 98 BPM | RESPIRATION RATE: 16 BRPM | OXYGEN SATURATION: 96 % | BODY MASS INDEX: 37.8 KG/M2 | WEIGHT: 270 LBS | DIASTOLIC BLOOD PRESSURE: 80 MMHG | TEMPERATURE: 97.4 F

## 2020-02-07 DIAGNOSIS — R68.89 INFLUENZA-LIKE SYMPTOMS: ICD-10-CM

## 2020-02-07 LAB
FLUAV+FLUBV AG SPEC QL IA: NORMAL
INT CON NEG: NORMAL
INT CON NEG: NORMAL
INT CON POS: NORMAL
INT CON POS: NORMAL
S PYO AG THROAT QL: NEGATIVE

## 2020-02-07 PROCEDURE — 87804 INFLUENZA ASSAY W/OPTIC: CPT | Performed by: PHYSICIAN ASSISTANT

## 2020-02-07 PROCEDURE — 87880 STREP A ASSAY W/OPTIC: CPT | Performed by: PHYSICIAN ASSISTANT

## 2020-02-07 PROCEDURE — 99213 OFFICE O/P EST LOW 20 MIN: CPT | Performed by: PHYSICIAN ASSISTANT

## 2020-02-07 ASSESSMENT — ENCOUNTER SYMPTOMS
SHORTNESS OF BREATH: 0
SPUTUM PRODUCTION: 0
SORE THROAT: 1
ABDOMINAL PAIN: 0
DIARRHEA: 0
FEVER: 1
COUGH: 1
VOMITING: 0
CHILLS: 1
NAUSEA: 0
HEADACHES: 1
WHEEZING: 0

## 2020-02-07 NOTE — PROGRESS NOTES
"Subjective:      Kristian Saucedo is a 39 y.o. male who presents with Headache (sore throat xfew days )        Influenza   This is a new problem. The current episode started in the past 7 days. The problem occurs constantly. The problem has been gradually worsening. Associated symptoms include chills, congestion, coughing, a fever, headaches and a sore throat. Pertinent negatives include no abdominal pain, chest pain, nausea or vomiting.       Review of Systems   Constitutional: Positive for chills, fever and malaise/fatigue.   HENT: Positive for congestion and sore throat.    Respiratory: Positive for cough. Negative for sputum production, shortness of breath and wheezing.    Cardiovascular: Negative for chest pain.   Gastrointestinal: Negative for abdominal pain, diarrhea, nausea and vomiting.   Neurological: Positive for headaches.        Objective:     /80   Pulse 98   Temp 36.3 °C (97.4 °F)   Resp 16   Ht 1.803 m (5' 11\")   Wt 122.5 kg (270 lb)   SpO2 96%   BMI 37.66 kg/m²      Physical Exam  Vitals signs reviewed.   Constitutional:       General: He is not in acute distress.     Appearance: Normal appearance. He is not ill-appearing or toxic-appearing.   HENT:      Right Ear: Tympanic membrane normal.      Left Ear: Tympanic membrane normal.      Nose: Mucosal edema and rhinorrhea present.      Mouth/Throat:      Mouth: Mucous membranes are moist.      Pharynx: Uvula midline. Posterior oropharyngeal erythema present. No pharyngeal swelling, oropharyngeal exudate or uvula swelling.      Tonsils: No tonsillar exudate or tonsillar abscesses. Swellin on the right. 0 on the left.   Eyes:      Conjunctiva/sclera: Conjunctivae normal.   Cardiovascular:      Rate and Rhythm: Normal rate and regular rhythm.      Heart sounds: Normal heart sounds.   Pulmonary:      Effort: Pulmonary effort is normal. No respiratory distress.      Breath sounds: Normal breath sounds. No wheezing, rhonchi or rales. "   Skin:     General: Skin is warm and dry.   Neurological:      General: No focal deficit present.      Mental Status: He is alert and oriented to person, place, and time.   Psychiatric:         Mood and Affect: Mood normal.         Behavior: Behavior normal.             No past medical history on file.   Past Surgical History:   Procedure Laterality Date   • STEPHAN BY LAPAROSCOPY  2019    Procedure: STEPHAN BY LAPAROSCOPY;  Surgeon: Elias Adam M.D.;  Location: SURGERY [facility];  Service: General   • LAPAROSCOPIC LYSIS OF ADHESIONS  2019    Procedure: LAPAROSCOPIC LYSIS OF ADHESIONS;  Surgeon: Elias Adam M.D.;  Location: SURGERY [facility];  Service: General   • OTHER ABDOMINAL SURGERY  2002    removal of some small intestine   • OTHER ORTHOPEDIC SURGERY Left     plates and screws      Social History     Socioeconomic History   • Marital status:      Spouse name: Not on file   • Number of children: Not on file   • Years of education: Not on file   • Highest education level: Not on file   Occupational History   • Not on file   Social Needs   • Financial resource strain: Not on file   • Food insecurity:     Worry: Not on file     Inability: Not on file   • Transportation needs:     Medical: Not on file     Non-medical: Not on file   Tobacco Use   • Smoking status: Former Smoker     Packs/day: 1.00     Last attempt to quit: 2019     Years since quittin.0   • Smokeless tobacco: Never Used   Substance and Sexual Activity   • Alcohol use: No   • Drug use: No   • Sexual activity: Not on file   Lifestyle   • Physical activity:     Days per week: Not on file     Minutes per session: Not on file   • Stress: Not on file   Relationships   • Social connections:     Talks on phone: Not on file     Gets together: Not on file     Attends [employer] service: Not on file     Active member of club or organization: Not on file     Attends meetings of clubs or organizations: Not  on file     Relationship status: Not on file   • Intimate partner violence:     Fear of current or ex partner: Not on file     Emotionally abused: Not on file     Physically abused: Not on file     Forced sexual activity: Not on file   Other Topics Concern   • Not on file   Social History Narrative   • Not on file    Patient has no known allergies.    Assessment/Plan:     1. Influenza-like symptoms    - POCT Rapid Strep A - Negative  - POCT Influenza A/B - Negative     Discussed viral etiology     Symptomatic care.  -Oral hydration and rest.   -Over the counter supressant as directed.  -Tylenol or ibuprofen for pain and fever as directed.   -Saline nasal spray or Mucinex as a decongestant.  -Infection control measures at home. Hand washing, covering sneeze/cough.  -Remain home from work, school, and other populated environments until at least 24 hours after you no longer have a fever.     Discussed associated complications, including risk of pneumonia and ear infections. Follow up with primary care provider. Follow up urgently for worsening symptoms, ear pain or drainage, shortness of breath, abdominal pain, or any other concerns. Follow up emergently for trouble breathing, elevated heart rate, chest pain, signs of dehydration, dizziness, weakness, decreased urine output, confusion, persistent vomiting, severe headache, neck stiffness, persistent high grade fever. Patient understood and agreed to plan of care.     Supportive care, differential diagnoses, and indications for immediate follow-up discussed with patient.    Pathogenesis of diagnosis discussed including typical length and natural progression. Patient expresses understanding and agrees to plan.

## 2020-05-24 ENCOUNTER — HOSPITAL ENCOUNTER (EMERGENCY)
Facility: HOSPITAL | Age: 40
Discharge: HOME | End: 2020-05-24
Attending: EMERGENCY MEDICINE | Admitting: EMERGENCY MEDICINE
Payer: COMMERCIAL

## 2020-05-24 VITALS
HEIGHT: 69 IN | TEMPERATURE: 98.1 F | SYSTOLIC BLOOD PRESSURE: 106 MMHG | WEIGHT: 148.59 LBS | DIASTOLIC BLOOD PRESSURE: 69 MMHG | RESPIRATION RATE: 16 BRPM | HEART RATE: 70 BPM | BODY MASS INDEX: 22.01 KG/M2 | OXYGEN SATURATION: 98 %

## 2020-05-24 DIAGNOSIS — S05.02XA ABRASION OF LEFT CORNEA, INITIAL ENCOUNTER: Primary | ICD-10-CM

## 2020-05-24 PROCEDURE — 90715 TDAP VACCINE 7 YRS/> IM: CPT | Performed by: EMERGENCY MEDICINE

## 2020-05-24 PROCEDURE — 99283 EMERGENCY DEPT VISIT LOW MDM: CPT

## 2020-05-24 PROCEDURE — 99284 EMERGENCY DEPT VISIT MOD MDM: CPT | Performed by: EMERGENCY MEDICINE

## 2020-05-24 PROCEDURE — 90471 IMMUNIZATION ADMIN: CPT

## 2020-05-24 RX ORDER — OFLOXACIN 3 MG/ML
1 SOLUTION/ DROPS OPHTHALMIC 4 TIMES DAILY
Qty: 5 ML | Refills: 0 | Status: SHIPPED | OUTPATIENT
Start: 2020-05-24 | End: 2020-05-29

## 2020-05-24 RX ORDER — TETRACAINE HYDROCHLORIDE 5 MG/ML
2 SOLUTION OPHTHALMIC ONCE
Status: COMPLETED | OUTPATIENT
Start: 2020-05-24 | End: 2020-05-24

## 2020-05-24 RX ADMIN — FLUORESCEIN SODIUM 1 STRIP: 1 STRIP OPHTHALMIC at 21:19

## 2020-05-24 RX ADMIN — TETANUS TOXOID, REDUCED DIPHTHERIA TOXOID AND ACELLULAR PERTUSSIS VACCINE, ADSORBED 0.5 ML: 5; 2.5; 8; 8; 2.5 SUSPENSION INTRAMUSCULAR at 21:24

## 2020-05-24 RX ADMIN — TETRACAINE HYDROCHLORIDE 2 DROP: 5 SOLUTION OPHTHALMIC at 21:18

## 2020-06-06 ENCOUNTER — APPOINTMENT (OUTPATIENT)
Dept: RADIOLOGY | Facility: IMAGING CENTER | Age: 40
End: 2020-06-06
Attending: PHYSICIAN ASSISTANT
Payer: MEDICAID

## 2020-06-06 ENCOUNTER — OFFICE VISIT (OUTPATIENT)
Dept: URGENT CARE | Facility: CLINIC | Age: 40
End: 2020-06-06
Payer: MEDICAID

## 2020-06-06 VITALS
DIASTOLIC BLOOD PRESSURE: 72 MMHG | BODY MASS INDEX: 37.8 KG/M2 | HEIGHT: 71 IN | OXYGEN SATURATION: 95 % | SYSTOLIC BLOOD PRESSURE: 124 MMHG | TEMPERATURE: 97 F | RESPIRATION RATE: 12 BRPM | WEIGHT: 270 LBS | HEART RATE: 95 BPM

## 2020-06-06 DIAGNOSIS — J06.9 UPPER RESPIRATORY TRACT INFECTION, UNSPECIFIED TYPE: ICD-10-CM

## 2020-06-06 DIAGNOSIS — R05.9 COUGH: ICD-10-CM

## 2020-06-06 LAB
INT CON NEG: NORMAL
INT CON POS: NORMAL
S PYO AG THROAT QL: NEGATIVE

## 2020-06-06 PROCEDURE — 99214 OFFICE O/P EST MOD 30 MIN: CPT | Performed by: PHYSICIAN ASSISTANT

## 2020-06-06 PROCEDURE — 87880 STREP A ASSAY W/OPTIC: CPT | Performed by: PHYSICIAN ASSISTANT

## 2020-06-06 PROCEDURE — 71046 X-RAY EXAM CHEST 2 VIEWS: CPT | Mod: TC | Performed by: FAMILY MEDICINE

## 2020-06-06 RX ORDER — DIPHENHYDRAMINE HYDROCHLORIDE AND LIDOCAINE HYDROCHLORIDE AND ALUMINUM HYDROXIDE AND MAGNESIUM HYDRO
5 KIT EVERY 6 HOURS PRN
Qty: 100 ML | Refills: 0 | Status: SHIPPED | OUTPATIENT
Start: 2020-06-06 | End: 2022-01-05

## 2020-06-06 ASSESSMENT — ENCOUNTER SYMPTOMS
DIARRHEA: 0
DIZZINESS: 0
EYE DISCHARGE: 0
CHILLS: 0
MUSCULOSKELETAL NEGATIVE: 1
NAUSEA: 0
FEVER: 0
HEMOPTYSIS: 0
WHEEZING: 0
COUGH: 1
SORE THROAT: 0
ABDOMINAL PAIN: 0
RHINORRHEA: 0
SPUTUM PRODUCTION: 1
EYE REDNESS: 0
VOMITING: 0
SHORTNESS OF BREATH: 0

## 2020-06-06 ASSESSMENT — FIBROSIS 4 INDEX: FIB4 SCORE: 8.57

## 2020-06-06 ASSESSMENT — COPD QUESTIONNAIRES: COPD: 0

## 2020-06-06 NOTE — PROGRESS NOTES
"Subjective:      Kristian Saucedo is a 39 y.o. male who presents with Sore Throat (cough , spitting green mucus x4 days)            Cough   This is a new problem. The current episode started in the past 7 days (5 days). The problem has been unchanged. The problem occurs every few minutes. The cough is productive of sputum. Pertinent negatives include no chest pain, chills, ear pain, eye redness, fever, hemoptysis, nasal congestion, postnasal drip, rash, rhinorrhea, sore throat, shortness of breath or wheezing. He has tried nothing for the symptoms. His past medical history is significant for pneumonia. There is no history of asthma or COPD.     Patient presents to urgent care reporting a 5 day history of productive cough, body aches, and sore throat. No fevers, chest pain, wheezing, SOB, or hemoptysis. He is a former smoker. He does reports a history of pneumonia. No known sick contacts or recent use of antibiotics. He had covid testing performed by [employer] that was sent to [facility], although he hasn't received the results yet.     Review of Systems   Constitutional: Negative for chills and fever.   HENT: Positive for congestion. Negative for ear pain, postnasal drip, rhinorrhea and sore throat.    Eyes: Negative for discharge and redness.   Respiratory: Positive for cough and sputum production. Negative for hemoptysis, shortness of breath and wheezing.    Cardiovascular: Negative for chest pain.   Gastrointestinal: Negative for abdominal pain, diarrhea, nausea and vomiting.   Genitourinary: Negative.    Musculoskeletal: Negative.    Skin: Negative for rash.   Neurological: Negative for dizziness.        Objective:     /72   Pulse 95   Temp 36.1 °C (97 °F) (Temporal)   Resp 12   Ht 1.803 m (5' 11\")   Wt 122.5 kg (270 lb)   SpO2 95%   BMI 37.66 kg/m²      Physical Exam  Vitals signs and nursing note reviewed.   Constitutional:       Appearance: Normal appearance. He is well-developed.   HENT:      Head: " Normocephalic and atraumatic.      Right Ear: Hearing, tympanic membrane, ear canal and external ear normal. There is no impacted cerumen.      Left Ear: Hearing, tympanic membrane, ear canal and external ear normal. There is no impacted cerumen.      Nose: Congestion present.      Mouth/Throat:      Mouth: Mucous membranes are moist.      Pharynx: Posterior oropharyngeal erythema present. No oropharyngeal exudate.   Eyes:      Extraocular Movements: Extraocular movements intact.      Conjunctiva/sclera: Conjunctivae normal.      Pupils: Pupils are equal, round, and reactive to light.   Neck:      Musculoskeletal: Normal range of motion.   Cardiovascular:      Rate and Rhythm: Normal rate and regular rhythm.      Heart sounds: Normal heart sounds. No murmur.   Pulmonary:      Effort: Pulmonary effort is normal.      Breath sounds: Normal breath sounds. No wheezing or rales.   Musculoskeletal: Normal range of motion.   Neurological:      Mental Status: He is alert and oriented to person, place, and time.   Psychiatric:         Behavior: Behavior normal.            PMH:  has no past medical history of Diabetes ([facility]).  MEDS:   Current Outpatient Medications:   •  DPH-Lido-AlHydr-MgHydr-Simeth (MAGIC MOUTHWASH BLM) Suspension, Take 5 mL by mouth every 6 hours as needed., Disp: 100 mL, Rfl: 0  •  ibuprofen (MOTRIN) 800 MG Tab, Take 1 Tab by mouth every 8 hours as needed. (Patient not taking: Reported on 2/7/2020), Disp: 30 Tab, Rfl: 0  ALLERGIES: No Known Allergies  SURGHX:   Past Surgical History:   Procedure Laterality Date   • STEPHAN BY LAPAROSCOPY  1/14/2019    Procedure: STEPHAN BY LAPAROSCOPY;  Surgeon: Elias Adam M.D.;  Location: [facility];  Service: General   • LAPAROSCOPIC LYSIS OF ADHESIONS  1/14/2019    Procedure: LAPAROSCOPIC LYSIS OF ADHESIONS;  Surgeon: Elias Adam M.D.;  Location: [facility];  Service: General   • OTHER ABDOMINAL SURGERY  01/23/2002    removal of some  small intestine   • OTHER ORTHOPEDIC SURGERY Left 1999    plates and screws     SOCHX:  reports that he quit smoking about 15 months ago. He smoked 1.00 pack per day. He has never used smokeless tobacco. He reports that he does not drink alcohol or use drugs.  FH: family history is not on file.       Assessment/Plan:       1. Upper respiratory tract infection, unspecified type    - POCT Rapid Strep A: NEGATIVE   - DX-CHEST-2 VIEWS; Future  IMPRESSION:     No active disease.    - DPH-Lido-AlHydr-MgHydr-Simeth (MAGIC MOUTHWASH BLM) Suspension; Take 5 mL by mouth every 6 hours as needed.  Dispense: 100 mL; Refill: 0    Advised patient symptoms are most likely viral in etiology. Increased fluids and rest. Discussed use of OTC cough and cold medication and Tylenol/Motrin for symptomatic relief.  Return for reevaluation or follow-up with PCP if symptoms persist or worsen. Supportive care, differential diagnoses, and indications for immediate follow-up discussed with patient.   Pathogenesis of diagnosis discussed including typical length and natural progression.  The patient demonstrated a good understanding and agreed with the treatment plan and has no further questions regarding care.   Vital signs stable, patient in no acute respiratory distress.     Called [facility] for Covid results, the test is still pending. Patient will go to [facility] to have results released.      Discussed with patient at length importance of communal effort to help decrease the infection rate of COVID 19. Patient advised to avoid large gatherings of people and practice good hand hygiene and respiratory precautions.       This patient is evaluated under [facility] isolation protocols in urgent care.  Out of an abundance of caution I am wearing a N95 mask, protective eye gear, gloves and gown through all interaction with patient.

## 2021-01-14 ENCOUNTER — HOSPITAL ENCOUNTER (EMERGENCY)
Facility: HOSPITAL | Age: 41
Discharge: HOME | End: 2021-01-14
Attending: EMERGENCY MEDICINE | Admitting: EMERGENCY MEDICINE
Payer: COMMERCIAL

## 2021-01-14 VITALS
OXYGEN SATURATION: 100 % | DIASTOLIC BLOOD PRESSURE: 77 MMHG | TEMPERATURE: 98.9 F | RESPIRATION RATE: 20 BRPM | HEART RATE: 103 BPM | SYSTOLIC BLOOD PRESSURE: 144 MMHG

## 2021-01-14 DIAGNOSIS — R07.89 ATYPICAL CHEST PAIN: ICD-10-CM

## 2021-01-14 DIAGNOSIS — F43.21 GRIEF REACTION: Primary | ICD-10-CM

## 2021-01-14 LAB
ATRIAL RATE: 90 BPM
P AXIS: 66 DEGREES
PR INTERVAL: 132 MS
QRS AXIS: 45 DEGREES
QRSD INTERVAL: 82 MS
QT INTERVAL: 336 MS
QTC INTERVAL: 411 MS
T WAVE AXIS: 58 DEGREES
VENTRICULAR RATE: 90 BPM

## 2021-01-14 PROCEDURE — 93010 ELECTROCARDIOGRAM REPORT: CPT | Performed by: INTERNAL MEDICINE

## 2021-01-14 PROCEDURE — 93005 ELECTROCARDIOGRAM TRACING: CPT

## 2021-01-14 PROCEDURE — 99283 EMERGENCY DEPT VISIT LOW MDM: CPT | Performed by: EMERGENCY MEDICINE

## 2021-01-14 PROCEDURE — 99284 EMERGENCY DEPT VISIT MOD MDM: CPT
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
## 2021-01-14 NOTE — DISCHARGE INSTRUCTIONS
I am sorry for your loss  Please be with your family and friends during this time, and reach out to them for support  Follow up with your family doctor if you are experiencing recurrent chest pain after you get through this difficult time, or return to the ED

## 2021-06-26 ENCOUNTER — APPOINTMENT (EMERGENCY)
Dept: RADIOLOGY | Facility: HOSPITAL | Age: 41
End: 2021-06-26
Payer: COMMERCIAL

## 2021-06-26 ENCOUNTER — APPOINTMENT (EMERGENCY)
Dept: NON INVASIVE DIAGNOSTICS | Facility: HOSPITAL | Age: 41
End: 2021-06-26
Payer: COMMERCIAL

## 2021-06-26 ENCOUNTER — HOSPITAL ENCOUNTER (EMERGENCY)
Facility: HOSPITAL | Age: 41
Discharge: HOME | End: 2021-06-26
Attending: EMERGENCY MEDICINE
Payer: COMMERCIAL

## 2021-06-26 VITALS
SYSTOLIC BLOOD PRESSURE: 109 MMHG | BODY MASS INDEX: 22.22 KG/M2 | OXYGEN SATURATION: 98 % | WEIGHT: 150 LBS | HEIGHT: 69 IN | DIASTOLIC BLOOD PRESSURE: 75 MMHG | TEMPERATURE: 98.7 F | HEART RATE: 61 BPM | RESPIRATION RATE: 18 BRPM

## 2021-06-26 DIAGNOSIS — S87.81XA CRUSHING INJURY OF RIGHT LOWER EXTREMITY, INITIAL ENCOUNTER: Primary | ICD-10-CM

## 2021-06-26 DIAGNOSIS — S80.11XA CONTUSION OF MULTIPLE SITES OF RIGHT LOWER EXTREMITY, INITIAL ENCOUNTER: ICD-10-CM

## 2021-06-26 PROCEDURE — 73590 X-RAY EXAM OF LOWER LEG: CPT

## 2021-06-26 PROCEDURE — 99285 EMERGENCY DEPT VISIT HI MDM: CPT | Performed by: EMERGENCY MEDICINE

## 2021-06-26 PROCEDURE — 99284 EMERGENCY DEPT VISIT MOD MDM: CPT

## 2021-06-26 PROCEDURE — 73610 X-RAY EXAM OF ANKLE: CPT

## 2021-06-26 PROCEDURE — 93971 EXTREMITY STUDY: CPT

## 2021-06-26 PROCEDURE — 73630 X-RAY EXAM OF FOOT: CPT
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
## 2021-06-26 NOTE — ED NOTES
Xray at bedside     [facility] [address] [place] [address]  06/26/21 2173

## 2021-06-27 PROCEDURE — 93971 EXTREMITY STUDY: CPT | Performed by: SURGERY

## 2021-06-29 ENCOUNTER — TELEPHONE (OUTPATIENT)
Dept: PAIN MEDICINE | Facility: CLINIC | Age: 41
End: 2021-06-29

## 2021-06-30 ENCOUNTER — PATIENT OUTREACH (OUTPATIENT)
Dept: CASE MANAGEMENT | Facility: HOSPITAL | Age: 41
End: 2021-06-30

## 2021-06-30 NOTE — PROGRESS NOTES
Outpatient Care Management Note:  Patient was identified via report as having a recent non urgent and non life threatening ED visit at [address] [place] [address]  Chart reviewed  Patient was in the ED on 06/26/21 for evaluation of crushing injury to right lower extremity  Per chart review, patient does not appear to have PCP  Pain medicine has tried to contact patient on 6/29/21 to offer follow up appointment   Telephone outreach attempt #1 made to introduce self and role of care management, follow up on general health, and outreach for any possible medical or psychosocial services needed  No answer  Left voicemail message with general contact information with name, title, phone number and days / times when I am available

## 2021-07-01 ENCOUNTER — TELEPHONE (OUTPATIENT)
Dept: OBGYN CLINIC | Facility: CLINIC | Age: 41
End: 2021-07-01

## 2021-07-04 ENCOUNTER — APPOINTMENT (EMERGENCY)
Dept: RADIOLOGY | Facility: HOSPITAL | Age: 41
End: 2021-07-04
Payer: COMMERCIAL

## 2021-07-04 ENCOUNTER — HOSPITAL ENCOUNTER (EMERGENCY)
Facility: HOSPITAL | Age: 41
Discharge: HOME | End: 2021-07-04
Attending: EMERGENCY MEDICINE
Payer: COMMERCIAL

## 2021-07-04 VITALS
SYSTOLIC BLOOD PRESSURE: 107 MMHG | BODY MASS INDEX: 22.14 KG/M2 | DIASTOLIC BLOOD PRESSURE: 62 MMHG | TEMPERATURE: 98.2 F | HEART RATE: 70 BPM | WEIGHT: 149.47 LBS | HEIGHT: 69 IN | OXYGEN SATURATION: 97 % | RESPIRATION RATE: 18 BRPM

## 2021-07-04 DIAGNOSIS — S61.311A LACERATION OF LEFT INDEX FINGER WITHOUT FOREIGN BODY WITH DAMAGE TO NAIL, INITIAL ENCOUNTER: Primary | ICD-10-CM

## 2021-07-04 PROCEDURE — 99284 EMERGENCY DEPT VISIT MOD MDM: CPT | Performed by: PHYSICIAN ASSISTANT

## 2021-07-04 PROCEDURE — 99283 EMERGENCY DEPT VISIT LOW MDM: CPT

## 2021-07-04 PROCEDURE — 12001 RPR S/N/AX/GEN/TRNK 2.5CM/<: CPT | Performed by: PHYSICIAN ASSISTANT

## 2021-07-04 PROCEDURE — 73140 X-RAY EXAM OF FINGER(S): CPT

## 2021-07-04 RX ORDER — CEPHALEXIN 250 MG/1
500 CAPSULE ORAL ONCE
Status: COMPLETED | OUTPATIENT
Start: 2021-07-04 | End: 2021-07-04

## 2021-07-04 RX ORDER — IBUPROFEN 600 MG/1
600 TABLET ORAL ONCE
Status: COMPLETED | OUTPATIENT
Start: 2021-07-04 | End: 2021-07-04

## 2021-07-04 RX ORDER — CEPHALEXIN 500 MG/1
500 CAPSULE ORAL EVERY 8 HOURS SCHEDULED
Qty: 21 CAPSULE | Refills: 0 | Status: SHIPPED | OUTPATIENT
Start: 2021-07-04 | End: 2021-07-11

## 2021-07-04 RX ORDER — ACETAMINOPHEN 325 MG/1
650 TABLET ORAL ONCE
Status: COMPLETED | OUTPATIENT
Start: 2021-07-04 | End: 2021-07-04

## 2021-07-04 RX ORDER — IBUPROFEN 600 MG/1
600 TABLET ORAL EVERY 6 HOURS PRN
Qty: 30 TABLET | Refills: 0 | Status: SHIPPED | OUTPATIENT
Start: 2021-07-04 | End: 2021-10-28 | Stop reason: SDUPTHER

## 2021-07-04 RX ORDER — BUPIVACAINE HYDROCHLORIDE 5 MG/ML
5 INJECTION, SOLUTION EPIDURAL; INTRACAUDAL ONCE
Status: COMPLETED | OUTPATIENT
Start: 2021-07-04 | End: 2021-07-04

## 2021-07-04 RX ORDER — GINSENG 100 MG
1 CAPSULE ORAL ONCE
Status: COMPLETED | OUTPATIENT
Start: 2021-07-04 | End: 2021-07-04

## 2021-07-04 RX ADMIN — ACETAMINOPHEN 650 MG: 325 TABLET ORAL at 20:51

## 2021-07-04 RX ADMIN — IBUPROFEN 600 MG: 600 TABLET ORAL at 20:51

## 2021-07-04 RX ADMIN — BACITRACIN 1 SMALL APPLICATION: 500 OINTMENT TOPICAL at 21:23

## 2021-07-04 RX ADMIN — BUPIVACAINE HYDROCHLORIDE 5 ML: 5 INJECTION, SOLUTION EPIDURAL; INTRACAUDAL; PERINEURAL at 20:53

## 2021-07-04 RX ADMIN — CEPHALEXIN 500 MG: 250 CAPSULE ORAL at 21:23

## 2021-07-05 NOTE — DISCHARGE INSTRUCTIONS
Keep wound clean and dry  Wash daily with warm water and soap  Apply topical antibiotic ointment  Watch for signs of infection such as increased redness, warmth, swelling, discharge/drainage, fever or any other concerns  Continue use of splint to protect area as it heals  Continue OTC tylenol and ibuprofen as needed for pain relief  Take antibiotic as directed for the full duration to prevent infection  Suture removal in 10-14 days  Follow up with PCP or return to ER as needed

## 2021-07-05 NOTE — ED PROVIDER NOTES
History  Chief Complaint   Patient presents with    Finger Injury     pt states he got his left  pointer finger wedged in the latch of a dump truck about 30 minutes ago, pt has avulsion to left second digit     47year old left hand dominant male presents for evaluation of a laceration to his left index finger  This occurred today about 30 minutes prior to arrival   Pt notes he was helping a brandon and got his finger wedged between the latch on the back of the dump truck  Pt notes throbbing pain and laceration to his distal index finger  No other injuries reported  He applied pressure and subsequently came in for evaluation  He reports he was feeling faint after this happened but did not pass out  He reports his tetanus is UTD  He has otherwise been in usual state of health  Denies chest pain, SOB, N/V/D, abdominal pain  Denies numbness, tingling  History provided by:  Patient and medical records   used: No        None       Past Medical History:   Diagnosis Date    Asthma        History reviewed  No pertinent surgical history  History reviewed  No pertinent family history  I have reviewed and agree with the history as documented  E-Cigarette/Vaping    E-Cigarette Use Never User      E-Cigarette/Vaping Substances     Social History     Tobacco Use    Smoking status: Never Smoker    Smokeless tobacco: Never Used   Vaping Use    Vaping Use: Never used   Substance Use Topics    Alcohol use: No    Drug use: No       Review of Systems   Constitutional: Negative  Negative for chills, fatigue and fever  HENT: Negative  Negative for congestion, rhinorrhea and sore throat  Eyes: Negative  Negative for visual disturbance  Respiratory: Negative  Negative for cough, shortness of breath and wheezing  Cardiovascular: Negative  Negative for chest pain  Gastrointestinal: Negative  Negative for abdominal pain, constipation, diarrhea, nausea and vomiting  Genitourinary: Negative  Negative for dysuria and frequency  Musculoskeletal: Positive for arthralgias  Negative for back pain and neck pain  Skin: Positive for wound  Negative for rash  Neurological: Negative  Negative for dizziness, numbness and headaches  Psychiatric/Behavioral: Negative  Negative for confusion  All other systems reviewed and are negative  Physical Exam  Physical Exam  Vitals and nursing note reviewed  Constitutional:       General: He is in acute distress  Appearance: Normal appearance  He is well-developed  He is not toxic-appearing  HENT:      Head: Normocephalic and atraumatic  Right Ear: Hearing and external ear normal       Left Ear: Hearing and external ear normal       Mouth/Throat:      Pharynx: Oropharynx is clear  Uvula midline  No oropharyngeal exudate  Eyes:      General: No scleral icterus  Conjunctiva/sclera: Conjunctivae normal       Pupils: Pupils are equal, round, and reactive to light  Neck:      Trachea: Trachea and phonation normal    Cardiovascular:      Rate and Rhythm: Normal rate and regular rhythm  Pulses: Normal pulses  Radial pulses are 2+ on the right side and 2+ on the left side  Heart sounds: Normal heart sounds, S1 normal and S2 normal  No murmur heard  Pulmonary:      Effort: Pulmonary effort is normal  No tachypnea or respiratory distress  Breath sounds: Normal breath sounds  No wheezing or rhonchi  Musculoskeletal:         General: Normal range of motion  Left hand: Laceration and tenderness present  No deformity  Normal strength  Normal sensation  Normal capillary refill  Normal pulse  Comments: Distal left index finger   Skin:     General: Skin is warm and dry  Capillary Refill: Capillary refill takes less than 2 seconds  Findings: Laceration and wound present  No rash  Comments: Pt has a crush injury of the distal left index finger    The nail appears loose but there is no underlying nailbed lacerations  No subungal hematoma  Pt has a small laceration which is 0 5 cm on the finger pad  There is also a 2 0 cm gaping laceration inferior to this on the finger tip at the DIP joint  Bleeding is controlled  Pt is overall soiled with dirt and debris (from head to toe)  There is no bony exposure  No noted tendon injury  He is able to bend and extend the finger  Strong radial pulse  Distal finger sensation is normal    Neurological:      General: No focal deficit present  Mental Status: He is alert and oriented to person, place, and time  GCS: GCS eye subscore is 4  GCS verbal subscore is 5  GCS motor subscore is 6  Cranial Nerves: No cranial nerve deficit  Sensory: Sensation is intact  No sensory deficit  Motor: Motor function is intact  No abnormal muscle tone  Gait: Gait normal       Deep Tendon Reflexes: Reflexes are normal and symmetric  Psychiatric:         Mood and Affect: Mood is anxious           Speech: Speech normal          Behavior: Behavior normal          Vital Signs  ED Triage Vitals [07/04/21 2014]   Temperature Pulse Respirations Blood Pressure SpO2   98 2 °F (36 8 °C) 79 18 113/63 98 %      Temp Source Heart Rate Source Patient Position - Orthostatic VS BP Location FiO2 (%)   Temporal Monitor Sitting Right arm --      Pain Score       Worst Possible Pain           Vitals:    07/04/21 2014 07/04/21 2100   BP: 113/63 107/62   Pulse: 79 70   Patient Position - Orthostatic VS: Sitting Lying         Visual Acuity      ED Medications  Medications   ibuprofen (MOTRIN) tablet 600 mg (600 mg Oral Given 7/4/21 2051)   acetaminophen (TYLENOL) tablet 650 mg (650 mg Oral Given 7/4/21 2051)   bupivacaine (PF) (MARCAINE) 0 5 % injection 5 mL (5 mL Infiltration Given by Other 7/4/21 2053)   bacitracin topical ointment 1 small application (1 small application Topical Given 7/4/21 2123)   cephalexin (KEFLEX) capsule 500 mg (500 mg Oral Given 7/4/21 2123)       Diagnostic Studies  Results Reviewed     None                 XR finger second digit-index LEFT    (Results Pending)              Procedures  Laceration repair    Date/Time: 7/4/2021 8:45 PM  Performed by: Randy Menendez PA-C  Authorized by: Randy Menendez PA-C   Consent: Verbal consent obtained  Risks and benefits: risks, benefits and alternatives were discussed  Consent given by: patient  Patient understanding: patient states understanding of the procedure being performed  Patient consent: the patient's understanding of the procedure matches consent given  Site marked: the operative site was marked  Radiology Images displayed and confirmed  If images not available, report reviewed: imaging studies available  Required items: required blood products, implants, devices, and special equipment available  Patient identity confirmed: verbally with patient and arm band  Body area: upper extremity  Location details: left index finger  Laceration length: 0 5 cm  Foreign bodies: no foreign bodies  Tendon involvement: none  Nerve involvement: none  Anesthesia: digital block    Anesthesia:  Local Anesthetic: bupivacaine 0 5% without epinephrine  Anesthetic total: 5 mL    Wound Dehiscence:  Superficial Wound Dehiscence: simple closure      Procedure Details:  Preparation: Patient was prepped and draped in the usual sterile fashion    Irrigation solution: saline  Irrigation method: syringe  Amount of cleaning: extensive  Debridement: none  Degree of undermining: none  Skin closure: 4-0 nylon  Number of sutures: 2  Technique: simple  Approximation: close  Approximation difficulty: simple  Dressing: antibiotic ointment, gauze roll and splint  Patient tolerance: patient tolerated the procedure well with no immediate complications  Cleaning details: dirt  Laceration repair    Date/Time: 7/4/2021 8:45 PM  Performed by: Randy Menendez PA-C  Authorized by: Randy Menendez PA-C   Consent: Verbal consent obtained  Risks and benefits: risks, benefits and alternatives were discussed  Consent given by: patient  Patient understanding: patient states understanding of the procedure being performed  Patient consent: the patient's understanding of the procedure matches consent given  Site marked: the operative site was marked  Radiology Images displayed and confirmed  If images not available, report reviewed: imaging studies available  Required items: required blood products, implants, devices, and special equipment available  Patient identity confirmed: verbally with patient and arm band  Body area: upper extremity  Location details: left index finger  Laceration length: 2 cm  Contamination: The wound is contaminated  Foreign bodies: no foreign bodies  Tendon involvement: none  Nerve involvement: none  Anesthesia: digital block    Anesthesia:  Local Anesthetic: bupivacaine 0 5% without epinephrine      Procedure Details:  Preparation: Patient was prepped and draped in the usual sterile fashion  Irrigation solution: saline  Irrigation method: syringe  Amount of cleaning: extensive  Debridement: none  Degree of undermining: none  Skin closure: 4-0 nylon  Number of sutures: 7  Technique: simple  Approximation: close  Approximation difficulty: simple  Dressing: antibiotic ointment, gauze roll and splint  Patient tolerance: patient tolerated the procedure well with no immediate complications  Cleaning details: dirt  Splint application    Date/Time: 7/4/2021 9:21 PM  Performed by: Radu Flores PA-C  Authorized by: Radu Flores PA-C   Universal Protocol:  Procedure performed by: (RN)  Consent: Verbal consent obtained    Risks and benefits: risks, benefits and alternatives were discussed  Consent given by: patient  Patient understanding: patient states understanding of the procedure being performed  Site marked: the operative site was marked  Patient identity confirmed: verbally with patient and arm band      Pre-procedure details:     Sensation:  Numbness (from local anesthetic)    Skin color:  Pink  Procedure details:     Laterality:  Left    Location:  Finger    Finger:  L index finger    Splint type:  Finger splint, static  Post-procedure details:     Pain:  Improved    Sensation:  Unchanged    Skin color:  Pink    Patient tolerance of procedure: Tolerated well, no immediate complications             ED Course  ED Course as of Jul 04 2140   Carolina Magdaleno Jul 04, 2021 2016 Review of prior records; last Tdap received 5/24/20 and therefore UTD       2025 Digital block performed left index finger  Pt tolerated well  2042 Independently viewed and interpreted by me - no fracture or acute osseous findings, no FB; pending official read  XR finger second digit-index LEFT   2113 Suturing complete  Pt tolerated well  Will apply bacitracin and non adherent with gauze wrap and apply finger splint  2131 Pt neurovascularly intact post application of finger splint  Verbal consent obtained for repair of the lacerations  Pt has 2 lacerations of the distal finger  Pt prepped and draped in usual sterile fashion  See procedure note above  Pt tolerated well  Wound approximated and hemostasis achieved  Tetanus is UTD  Bacitracin and sterile dressing applied  Finger splint applied to protect the wound and can be used as needed  He was advised that finger nail could possibly fall off and he notes something similar happened with a prior finger injury  Reviewed standard wound care  S/sx infection reviewed  Suture removal in 10-14 days  Pt discharged on keflex for infection prevention due to dirty wound  OTC meds as needed for pain relief  Strict return precautions outlined  Advised outpatient follow up with PCP for suture removal or return to ER for change in condition as outlined  Pt verbalized understanding and had no further questions    Pt left in stable, improved condition  MDM  Number of Diagnoses or Management Options  Laceration of left index finger without foreign body with damage to nail, initial encounter: new and requires workup     Amount and/or Complexity of Data Reviewed  Tests in the radiology section of CPT®: ordered and reviewed  Decide to obtain previous medical records or to obtain history from someone other than the patient: yes  Obtain history from someone other than the patient: yes  Review and summarize past medical records: yes  Independent visualization of images, tracings, or specimens: yes    Patient Progress  Patient progress: improved      Disposition  Final diagnoses:   Laceration of left index finger without foreign body with damage to nail, initial encounter     Time reflects when diagnosis was documented in both MDM as applicable and the Disposition within this note     Time User Action Codes Description Comment    7/4/2021  9:18 PM Basil Rachel Add [[id number]] Laceration of left index finger without foreign body with damage to nail, initial encounter       ED Disposition     ED Disposition Condition Date/Time Comment    Discharge Stable Sun Jul 4, 2021  9:18 PM Ugo Tenorio discharge to home/self care              Follow-up Information     Follow up With Specialties Details Why Contact Info Additional Information    [facility] Emergency Department Emergency Medicine  For suture removal [phone]  [facility] Emergency Department, [address] [employer] [place], [place], [address]          Discharge Medication List as of 7/4/2021  9:20 PM      START taking these medications    Details   cephalexin (KEFLEX) 500 mg capsule Take 1 capsule (500 mg total) by mouth every 8 (eight) hours for 7 days, Starting Sun 7/4/2021, Until Sun 7/11/2021, Normal      ibuprofen (MOTRIN) 600 mg tablet Take 1 tablet (600 mg total) by mouth every 6 (six) hours as needed for moderate pain, Starting Sun 7/4/2021, Normal           No discharge procedures on file      PDMP Review     None          ED Provider  Electronically Signed by           Antony Strickland PA-C  07/04/21 4743

## 2021-07-07 ENCOUNTER — PATIENT OUTREACH (OUTPATIENT)
Dept: CASE MANAGEMENT | Facility: HOSPITAL | Age: 41
End: 2021-07-07

## 2021-07-07 NOTE — PROGRESS NOTES
Outpatient Care Management Note:  Patient was identified via report as having a recent non urgent and non life threatening ED visit at [facility]  Chart reviewed  Patient was in the ED on 06/26/21 for evaluation of crushing injury to right leg  Patient returned to the ED on 7/4/21 for evaluation of lacerated left index finger  Per chart review, patient does not appear to have PCP  Orthopedics has tried to contact patient twice to offer follow up appointment   Telephone outreach attempt #2 made to introduce self and role of care management, follow up on general health, and outreach for any possible medical or psychosocial services needed  No answer  Mailbox is full and unable to leave message

## 2021-07-13 ENCOUNTER — PATIENT OUTREACH (OUTPATIENT)
Dept: CASE MANAGEMENT | Facility: HOSPITAL | Age: 41
End: 2021-07-13

## 2021-07-13 NOTE — PROGRESS NOTES
Outpatient Care Management Note:  Telephone outreach attempt #3 made to introduce self and role of care management, follow up on general health, and outreach for any possible medical or psychosocial services needed  No answer  Mailbox is full and unable to leave message  Outreach attempt has been unsuccessful  ED followup episode will be closed

## 2021-08-12 ENCOUNTER — PATIENT OUTREACH (OUTPATIENT)
Dept: CASE MANAGEMENT | Facility: HOSPITAL | Age: 41
End: 2021-08-12

## 2021-08-12 NOTE — PROGRESS NOTES
Outpatient Care Management Note:  Patient was identified via report as having a recent non urgent and non life threatening ED visit at [address] [place] [address]  Chart reviewed  Patient was in the ED on 08/03/21 for suture removal   Telephone outreach attempt #3 made to introduce self and role of care management, follow up on general health, and outreach for any possible medical or psychosocial services needed  No answer  Received message number is not valid

## 2021-08-24 ENCOUNTER — PATIENT OUTREACH (OUTPATIENT)
Dept: CASE MANAGEMENT | Facility: HOSPITAL | Age: 41
End: 2021-08-24

## 2021-08-24 NOTE — LETTER
Date: 08/24/21    Dear Zabala Pro,   My name is [facility] and I'm a registered nurse care manager working with [address] [place] [address] [facility] Outpatient Care Management  I have not been able to reach you and would like to set a time that I can talk to you over the phone or in-person  My work is to help patients that live in our community get the medical care they need, at the right place, and at the right time  This includes patients who may have been in the hospital or emergency room  I also help people find solutions to practical needs such as transportation, insurance, food insecurity, financial assistance, durable medical equipment, personal care, and much more  I have enclosed my cell phone number below  Please call me with any questions you may have  I look forward to speaking with you      Sincerely,     [facility], RN  Outpatient Care Manager    [phone]

## 2021-08-24 NOTE — PROGRESS NOTES
Out Patient Care Management Note:  No response to calls  A letter is being sent to the last known home address  ED followup episode will be closed

## 2021-09-07 ENCOUNTER — HOSPITAL ENCOUNTER (EMERGENCY)
Facility: HOSPITAL | Age: 41
Discharge: HOME | End: 2021-09-07
Attending: EMERGENCY MEDICINE | Admitting: EMERGENCY MEDICINE
Payer: COMMERCIAL

## 2021-09-07 VITALS
HEART RATE: 66 BPM | RESPIRATION RATE: 18 BRPM | DIASTOLIC BLOOD PRESSURE: 78 MMHG | OXYGEN SATURATION: 98 % | SYSTOLIC BLOOD PRESSURE: 115 MMHG | TEMPERATURE: 97.8 F

## 2021-09-07 DIAGNOSIS — K04.7 DENTAL ABSCESS: Primary | ICD-10-CM

## 2021-09-07 PROCEDURE — 99283 EMERGENCY DEPT VISIT LOW MDM: CPT

## 2021-09-07 PROCEDURE — 99284 EMERGENCY DEPT VISIT MOD MDM: CPT | Performed by: EMERGENCY MEDICINE

## 2021-09-07 PROCEDURE — 10060 I&D ABSCESS SIMPLE/SINGLE: CPT | Performed by: EMERGENCY MEDICINE

## 2021-09-07 RX ORDER — AMOXICILLIN AND CLAVULANATE POTASSIUM 875; 125 MG/1; MG/1
1 TABLET, FILM COATED ORAL ONCE
Status: COMPLETED | OUTPATIENT
Start: 2021-09-07 | End: 2021-09-07

## 2021-09-07 RX ORDER — AMOXICILLIN AND CLAVULANATE POTASSIUM 875; 125 MG/1; MG/1
1 TABLET, FILM COATED ORAL 2 TIMES DAILY
Qty: 14 TABLET | Refills: 0 | Status: SHIPPED | OUTPATIENT
Start: 2021-09-07 | End: 2021-09-14

## 2021-09-07 RX ADMIN — AMOXICILLIN AND CLAVULANATE POTASSIUM 1 TABLET: 875; 125 TABLET, FILM COATED ORAL at 01:51

## 2021-09-07 RX ADMIN — TOPICAL ANESTHETIC 2 SPRAY: 200 SPRAY DENTAL; PERIODONTAL at 01:51

## 2021-09-07 NOTE — DISCHARGE INSTRUCTIONS
Tylenol 650mg every 6 hours as needed for pain, fever (max 3000mg in 24 hours)   Aleve 2 tabs twice daily with food OR ibuprofen 200-800mg every 8 hours with food as needed for pain   Finish 1 week of augmentin  Eat yogurt, take pro-biotic over the counter, or acidophilus tablet (in vitamin aisle) to prevent diarrhea     Return with facial swellling or redness like a sunburn difficulty swallowing difficulty opening jaw or any new or worsening symptoms

## 2021-09-07 NOTE — ED PROVIDER NOTES
History  Chief Complaint   Patient presents with    Abscess     dental-lower R-side     72-year-old male presents with a right lower toothache over the last week he is denying any fever chills he has occasionally had a foul taste in the mouth no fevers denies sore throat difficulty swallowing or voice change denies having problems with this tooth in the past he has no difficulty opening his mouth  Been taking Tylenol ibuprofen for the pain but it is not helping  He has had some facial swelling is denying any neck pain          Prior to Admission Medications   Prescriptions Last Dose Informant Patient Reported? Taking?   ibuprofen (MOTRIN) 600 mg tablet   No No   Sig: Take 1 tablet (600 mg total) by mouth every 6 (six) hours as needed for moderate pain      Facility-Administered Medications: None       Past Medical History:   Diagnosis Date    Asthma        History reviewed  No pertinent surgical history  History reviewed  No pertinent family history  I have reviewed and agree with the history as documented  E-Cigarette/Vaping    E-Cigarette Use Never User      E-Cigarette/Vaping Substances    Nicotine No     THC No     CBD No     Flavoring No     Other No     Unknown No      Social History     Tobacco Use    Smoking status: Never Smoker    Smokeless tobacco: Never Used   Vaping Use    Vaping Use: Never used   Substance Use Topics    Alcohol use: No    Drug use: No       Review of Systems   Constitutional: Negative for activity change, appetite change, chills and fever  HENT: Positive for dental problem and facial swelling  Negative for congestion, ear pain, hearing loss, mouth sores, sinus pressure, sinus pain, sore throat, trouble swallowing and voice change  Eyes: Negative for discharge  Respiratory: Negative for cough and shortness of breath  Cardiovascular: Negative for chest pain  Gastrointestinal: Negative for abdominal pain, nausea and vomiting     Musculoskeletal: Negative for neck pain and neck stiffness  Neurological: Negative for dizziness, weakness, numbness and headaches  Psychiatric/Behavioral: Negative for confusion  All other systems reviewed and are negative  Physical Exam  Physical Exam  Vitals and nursing note reviewed  Constitutional:       General: He is not in acute distress  Appearance: He is not ill-appearing, toxic-appearing or diaphoretic  HENT:      Right Ear: Tympanic membrane and external ear normal  There is no impacted cerumen  Left Ear: Tympanic membrane and external ear normal  There is no impacted cerumen  Nose: Nose normal       Mouth/Throat:      Mouth: Mucous membranes are moist         Comments: Poor dentition no trismus patient is a the posterior pharynx is unremarkable uvula is midline no erythema edema or exudate  Patient has a pointing lesion lateral to tooth number 29 there is no tongue elevation buccal mucosa soft patient has no erythema externally to the face has some tenderness just lateral to the lateral right commissure but no induration there is no submandibular tenderness or induration no evidence of Monty's angina  Eyes:      General:         Right eye: No discharge  Left eye: No discharge  Extraocular Movements: Extraocular movements intact  Conjunctiva/sclera: Conjunctivae normal       Pupils: Pupils are equal, round, and reactive to light  Cardiovascular:      Rate and Rhythm: Normal rate and regular rhythm  Heart sounds: No murmur heard  Pulmonary:      Effort: Pulmonary effort is normal  No respiratory distress  Breath sounds: Normal breath sounds  Musculoskeletal:         General: Normal range of motion  Cervical back: Normal range of motion  No rigidity or tenderness  Lymphadenopathy:      Cervical: No cervical adenopathy  Skin:     General: Skin is warm and dry  Capillary Refill: Capillary refill takes less than 2 seconds  Findings: No rash  Neurological:      Mental Status: He is alert  Cranial Nerves: No cranial nerve deficit  Sensory: No sensory deficit  Motor: No weakness  Coordination: Coordination normal       Gait: Gait normal    Psychiatric:         Mood and Affect: Mood normal          Vital Signs  ED Triage Vitals [09/07/21 0105]   Temperature Pulse Respirations Blood Pressure SpO2   97 8 °F (36 6 °C) 66 18 115/78 99 %      Temp Source Heart Rate Source Patient Position - Orthostatic VS BP Location FiO2 (%)   Tympanic Monitor Lying Left arm --      Pain Score       Worst Possible Pain           Vitals:    09/07/21 0105   BP: 115/78   Pulse: 66   Patient Position - Orthostatic VS: Lying         Visual Acuity      ED Medications  Medications   benzocaine (HURRICAINE) 20 % mucosal spray 2 spray (2 sprays Mucosal Given 9/7/21 0151)   amoxicillin-clavulanate (AUGMENTIN) 875-125 mg per tablet 1 tablet (1 tablet Oral Given 9/7/21 0151)       Diagnostic Studies  Results Reviewed     None                 No orders to display              Procedures  Incision and drain    Date/Time: 9/7/2021 1:50 AM  Performed by: Geneva Lawson MD  Authorized by: Geneva Lawson MD   Universal Protocol:  Procedure performed by: Gautam Sloan RN)  Consent: Verbal consent obtained  Risks and benefits: risks, benefits and alternatives were discussed  Consent given by: patient  Patient understanding: patient states understanding of the procedure being performed  Patient identity confirmed: verbally with patient and arm band      Patient location:  ED  Location:     Type:  Abscess (dental buccal side of #29)    Location:  Mouth    Location Detail:  Intraoral    Mouth location:  Alveolar process  Anesthesia (see MAR for exact dosages): Anesthesia method:  Topical application    Topical anesthesia: benzocaine spray    Procedure details:     Complexity:  Simple    Needle aspiration: no      Incision types:  Stab incision    Scalpel blade:  11 Approach:  Open    Incision depth:  Subcutaneous    Irrigation with saline:  Patient irrigated with tap water     Drainage:  Purulent    Drainage amount:  Scant    Wound treatment:  Wound left open    Packing materials:  None  Post-procedure details:     Patient tolerance of procedure: Tolerated well, no immediate complications             ED Course                             SBIRT 22yo+      Most Recent Value   SBIRT (22 yo +)   In order to provide better care to our patients, we are screening all of our patients for alcohol and drug use  Would it be okay to ask you these screening questions? Yes Filed at: 09/07/2021 0152   Initial Alcohol Screen: US AUDIT-C    1  How often do you have a drink containing alcohol?  0 Filed at: 09/07/2021 0152   2  How many drinks containing alcohol do you have on a typical day you are drinking? 0 Filed at: 09/07/2021 0152   3a  Male UNDER 65: How often do you have five or more drinks on one occasion? 0 Filed at: 09/07/2021 0152   Audit-C Score  0 Filed at: 09/07/2021 2949   TELLO: How many times in the past year have you    Used an illegal drug or used a prescription medication for non-medical reasons? Never Filed at: 09/07/2021 0236                    [facility]  Number of Diagnoses or Management Options  Dental abscess  Diagnosis management comments: Mdm:  Reviewed with patient he is clinically presenting with a dental abscess and pointing lesion recommend that we [address]  the abscess in the intraoral cavity to facilitate drainage she is agreeable to proceeding will provide place him on Augmentin reviewed he will still require dental follow-up    Should he have worsening symptoms to return to the emergency department verbalized understanding provided with follow-up information for dental clinics      Disposition  Final diagnoses:   Dental abscess     Time reflects when diagnosis was documented in both MDM as applicable and the Disposition within this note     Time User Action Codes

## 2021-10-07 ENCOUNTER — LAB REQUISITION (OUTPATIENT)
Dept: LAB | Facility: HOSPITAL | Age: 41
End: 2021-10-07

## 2021-10-07 DIAGNOSIS — Z02.83 ENCOUNTER FOR BLOOD-ALCOHOL AND BLOOD-DRUG TEST: ICD-10-CM

## 2021-10-22 ENCOUNTER — HOSPITAL ENCOUNTER (EMERGENCY)
Facility: HOSPITAL | Age: 41
Discharge: HOME | End: 2021-10-22
Attending: EMERGENCY MEDICINE | Admitting: EMERGENCY MEDICINE
Payer: COMMERCIAL

## 2021-10-22 VITALS
RESPIRATION RATE: 18 BRPM | SYSTOLIC BLOOD PRESSURE: 122 MMHG | TEMPERATURE: 98.2 F | DIASTOLIC BLOOD PRESSURE: 82 MMHG | BODY MASS INDEX: 21.91 KG/M2 | HEIGHT: 69 IN | HEART RATE: 58 BPM | OXYGEN SATURATION: 98 % | WEIGHT: 147.93 LBS

## 2021-10-22 DIAGNOSIS — J02.9 PHARYNGITIS: Primary | ICD-10-CM

## 2021-10-22 PROCEDURE — 99284 EMERGENCY DEPT VISIT MOD MDM: CPT | Performed by: EMERGENCY MEDICINE

## 2021-10-22 PROCEDURE — 99282 EMERGENCY DEPT VISIT SF MDM: CPT

## 2021-10-22 RX ADMIN — DEXAMETHASONE SODIUM PHOSPHATE 10 MG: 10 INJECTION, SOLUTION INTRAMUSCULAR; INTRAVENOUS at 10:28

## 2021-10-27 ENCOUNTER — TELEPHONE (OUTPATIENT)
Dept: OTHER | Facility: OTHER | Age: 41
End: 2021-10-27

## 2021-10-28 ENCOUNTER — HOSPITAL ENCOUNTER (EMERGENCY)
Facility: HOSPITAL | Age: 41
Discharge: HOME | End: 2021-10-28
Attending: EMERGENCY MEDICINE
Payer: COMMERCIAL

## 2021-10-28 VITALS
SYSTOLIC BLOOD PRESSURE: 122 MMHG | DIASTOLIC BLOOD PRESSURE: 65 MMHG | RESPIRATION RATE: 18 BRPM | WEIGHT: 147 LBS | HEART RATE: 68 BPM | TEMPERATURE: 97.5 F | HEIGHT: 69 IN | OXYGEN SATURATION: 98 % | BODY MASS INDEX: 21.77 KG/M2

## 2021-10-28 DIAGNOSIS — J02.9 PHARYNGITIS: Primary | ICD-10-CM

## 2021-10-28 DIAGNOSIS — S61.311A LACERATION OF LEFT INDEX FINGER WITHOUT FOREIGN BODY WITH DAMAGE TO NAIL, INITIAL ENCOUNTER: ICD-10-CM

## 2021-10-28 LAB — S PYO DNA THROAT QL NAA+PROBE: NORMAL

## 2021-10-28 PROCEDURE — 87651 STREP A DNA AMP PROBE: CPT | Performed by: PHYSICIAN ASSISTANT

## 2021-10-28 PROCEDURE — 99283 EMERGENCY DEPT VISIT LOW MDM: CPT

## 2021-10-28 PROCEDURE — 99284 EMERGENCY DEPT VISIT MOD MDM: CPT | Performed by: PHYSICIAN ASSISTANT

## 2021-10-28 PROCEDURE — U0005 INFEC AGEN DETEC AMPLI PROBE: HCPCS | Performed by: PHYSICIAN ASSISTANT

## 2021-10-28 PROCEDURE — U0003 INFECTIOUS AGENT DETECTION BY NUCLEIC ACID (DNA OR RNA); SEVERE ACUTE RESPIRATORY SYNDROME CORONAVIRUS 2 (SARS-COV-2) (CORONAVIRUS DISEASE [COVID-19]), AMPLIFIED PROBE TECHNIQUE, MAKING USE OF HIGH THROUGHPUT TECHNOLOGIES AS DESCRIBED BY CMS-2020-01-R: HCPCS | Performed by: PHYSICIAN ASSISTANT

## 2021-10-28 RX ORDER — IBUPROFEN 600 MG/1
600 TABLET ORAL EVERY 6 HOURS PRN
Qty: 30 TABLET | Refills: 0 | Status: SHIPPED | OUTPATIENT
Start: 2021-10-28

## 2021-10-28 RX ORDER — IBUPROFEN 400 MG/1
800 TABLET ORAL ONCE
Status: COMPLETED | OUTPATIENT
Start: 2021-10-28 | End: 2021-10-28

## 2021-10-28 RX ADMIN — IBUPROFEN 800 MG: 400 TABLET ORAL at 19:45

## 2021-10-29 LAB — SARS-COV-2 RNA RESP QL NAA+PROBE: NEGATIVE

## 2022-01-05 ENCOUNTER — OFFICE VISIT (OUTPATIENT)
Dept: URGENT CARE | Facility: PHYSICIAN GROUP | Age: 42
End: 2022-01-05
Payer: MEDICAID

## 2022-01-05 VITALS
OXYGEN SATURATION: 97 % | SYSTOLIC BLOOD PRESSURE: 124 MMHG | HEART RATE: 65 BPM | BODY MASS INDEX: 37.93 KG/M2 | WEIGHT: 280 LBS | TEMPERATURE: 98.2 F | DIASTOLIC BLOOD PRESSURE: 86 MMHG | HEIGHT: 72 IN | RESPIRATION RATE: 16 BRPM

## 2022-01-05 DIAGNOSIS — B34.9 VIRAL ILLNESS: ICD-10-CM

## 2022-01-05 LAB
EXTERNAL QUALITY CONTROL: NORMAL
SARS-COV+SARS-COV-2 AG RESP QL IA.RAPID: NEGATIVE

## 2022-01-05 PROCEDURE — 99214 OFFICE O/P EST MOD 30 MIN: CPT | Performed by: PHYSICIAN ASSISTANT

## 2022-01-05 PROCEDURE — 87426 SARSCOV CORONAVIRUS AG IA: CPT | Performed by: PHYSICIAN ASSISTANT

## 2022-01-05 RX ORDER — ONDANSETRON 4 MG/1
4 TABLET, FILM COATED ORAL EVERY 4 HOURS PRN
Qty: 20 TABLET | Refills: 0 | Status: SHIPPED | OUTPATIENT
Start: 2022-01-05

## 2022-01-05 ASSESSMENT — ENCOUNTER SYMPTOMS
HEADACHES: 1
ABDOMINAL PAIN: 0
VOMITING: 1
MYALGIAS: 1
SORE THROAT: 0
SHORTNESS OF BREATH: 0
DIARRHEA: 1
NAUSEA: 1
COUGH: 1
FEVER: 1
SINUS PAIN: 1
CHILLS: 1
DIZZINESS: 1

## 2022-01-05 NOTE — LETTER
January 5, 2022         Patient: Kristian Saucedo   YOB: 1980   Date of Visit: 1/5/2022           To Whom it May Concern:    Kristian Saucedo was seen in my clinic on 1/5/2022.  Please excuse his absence from work through the end of this week.    If you have any questions or concerns, please don't hesitate to call.        Sincerely,           Sheba Rodrigues P.A.-C.  Electronically Signed

## 2022-01-06 NOTE — PROGRESS NOTES
Subjective     Joesdagmar Saucedo is a 41 y.o. male who presents with Fever (x6 days. cough, diarrhea, vomiting, HA)    URI   This is a new problem. Episode onset: Symptoms started 4 days ago. The problem has been gradually worsening. Maximum temperature: Patient has had few days of subjective fever. Associated symptoms include congestion, coughing, diarrhea, ear pain, headaches, nausea, sinus pain and vomiting. Pertinent negatives include no abdominal pain, chest pain or sore throat. Associated symptoms comments: The diarrhea and nausea/vomiting started today. He has tried decongestant and increased fluids for the symptoms. The treatment provided no relief.   The 2 nights preceding his symptom onset patient was staying at the [facility] in [place].  He is vaccinated for COVID-19 virus but has not yet received his booster dose.    Review of Systems   Constitutional: Positive for chills, fever and malaise/fatigue.   HENT: Positive for congestion, ear pain and sinus pain. Negative for sore throat.    Respiratory: Positive for cough. Negative for shortness of breath.    Cardiovascular: Negative for chest pain.   Gastrointestinal: Positive for diarrhea, nausea and vomiting. Negative for abdominal pain.   Musculoskeletal: Positive for myalgias.   Neurological: Positive for dizziness and headaches.       PMH:  has no past medical history of Diabetes (HCC).  MEDS:   Current Outpatient Medications:   •  ondansetron (ZOFRAN) 4 MG Tab tablet, Take 1 Tablet by mouth every four hours as needed for Nausea/Vomiting., Disp: 20 Tablet, Rfl: 0  ALLERGIES: No Known Allergies  SURGHX:   Past Surgical History:   Procedure Laterality Date   • STEPHAN BY LAPAROSCOPY  1/14/2019    Procedure: STEPHAN BY LAPAROSCOPY;  Surgeon: Elias Adam M.D.;  Location: SURGERY [facility];  Service: General   • LAPAROSCOPIC LYSIS OF ADHESIONS  1/14/2019    Procedure: LAPAROSCOPIC LYSIS OF ADHESIONS;  Surgeon: Elias Adam M.D.;  Location:  [facility]URGERY [facility];  Service: General   • OTHER ABDOMINAL SURGERY  01/23/2002    removal of some small intestine   • OTHER ORTHOPEDIC SURGERY Left 1999    plates and screws     SOCHX:  reports that he quit smoking about 2 years ago. He smoked 1.00 pack per day. He has never used smokeless tobacco. He reports that he does not drink alcohol and does not use drugs.  FH: Family history was reviewed, no pertinent findings to report      Objective     /86   Pulse 65   Temp 36.8 °C (98.2 °F)   Resp 16   Ht 1.829 m (6')   Wt (!) 127 kg (280 lb)   SpO2 97%   BMI 37.97 kg/m²      Physical Exam  Constitutional:       Appearance: He is well-developed.   HENT:      Head: Normocephalic and atraumatic.      Right Ear: Tympanic membrane, ear canal and external ear normal.      Left Ear: Tympanic membrane, ear canal and external ear normal.      Nose: Mucosal edema and congestion present. No rhinorrhea.      Mouth/Throat:      Lips: Pink.      Mouth: Mucous membranes are moist.      Pharynx: Oropharynx is clear.   Eyes:      Conjunctiva/sclera: Conjunctivae normal.      Pupils: Pupils are equal, round, and reactive to light.   Cardiovascular:      Rate and Rhythm: Normal rate and regular rhythm.      Heart sounds: Normal heart sounds. No murmur heard.      Pulmonary:      Effort: Pulmonary effort is normal.      Breath sounds: Normal breath sounds. No decreased breath sounds, wheezing, rhonchi or rales.   Musculoskeletal:      Cervical back: Normal range of motion.   Lymphadenopathy:      Cervical: No cervical adenopathy.   Skin:     General: Skin is warm and dry.      Capillary Refill: Capillary refill takes less than 2 seconds.   Neurological:      Mental Status: He is alert and oriented to person, place, and time.   Psychiatric:         Behavior: Behavior normal.         Judgment: Judgment normal.         POCT SARS-COV Antigen SWAPNA Manual Result - Negative  Assessment & Plan     1. Viral illness  - POCT SARS-COV  Antigen SWAPNA Manual Result  - ondansetron (ZOFRAN) 4 MG Tab tablet; Take 1 Tablet by mouth every four hours as needed for Nausea/Vomiting.  Dispense: 20 Tablet; Refill: 0  - OTC cold/flu medications  - PO fluids  - Rest  - Tylenol or ibuprofen as needed for fever > 100.4 F    There were no PCR test available at time of the visit.  Discussed with patient that if his symptoms do not significantly improve after another 3 days I would recommend that he return to the urgent care for reevaluation and possible PCR testing at that time.      My total time spent caring for the patient on the day of the encounter was 30 minutes.   This does not include time spent on separately billable procedures/tests.        Differential Diagnosis, natural history, and supportive care discussed. Return to the Urgent Care or follow up with your PCP if symptoms fail to resolve, or for any new or worsening symptoms. Emergency room precautions discussed. Patient and/or family appears understanding of information.
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
## 2022-07-29 ENCOUNTER — APPOINTMENT (OUTPATIENT)
Dept: URGENT CARE | Facility: PHYSICIAN GROUP | Age: 42
End: 2022-07-29
Payer: MEDICAID

## 2022-11-27 ENCOUNTER — HOSPITAL ENCOUNTER (EMERGENCY)
Facility: HOSPITAL | Age: 42
Discharge: HOME | End: 2022-11-27
Attending: EMERGENCY MEDICINE

## 2022-11-27 VITALS
DIASTOLIC BLOOD PRESSURE: 68 MMHG | BODY MASS INDEX: 23.02 KG/M2 | RESPIRATION RATE: 16 BRPM | OXYGEN SATURATION: 96 % | SYSTOLIC BLOOD PRESSURE: 116 MMHG | WEIGHT: 155.87 LBS | HEART RATE: 66 BPM | TEMPERATURE: 97.6 F

## 2022-11-27 DIAGNOSIS — G62.9 PERIPHERAL NEUROPATHY: Primary | ICD-10-CM

## 2022-11-27 NOTE — ED PROVIDER NOTES
History  Chief Complaint   Patient presents with   • Arm Pain     Patient presents with left arm pain and tingling over the last two weeks  He stated the pain starts in his hand and feels like it shoots up his arm and causes tingling  Equal strength and sensation in all extremities  Patient also stated he wants to be checked for diabetes  47-year-old male presents for evaluation of left hand paresthesia  Patient reports over the last 2 weeks he has felt the symptoms, symptoms are intermittent  Patient believes they start in his left 5th finger in radiate up the ulnar aspect of his forearm  Patient denies known trauma or injury, he is left handed  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  Patient feels the sensations when he is resting and also at work  He cannot identify a position that brings on this sensation  Patient does have a callused wound on his 5th finger which he is concerned for infection as the source of these sensations  He has no swelling, erythema, drainage, fevers or chills  He has full range of motion to this finger  Patient denies other symptoms at this time  He is concerned for diabetes as the source of the tingling  Per review patient had a fasting glucose performed in January 2022 glucose of 93; he denies polydipsia, polyuria, weight gain or loss, GI sx            Prior to Admission Medications   Prescriptions Last Dose Informant Patient Reported? Taking?   ibuprofen (MOTRIN) 600 mg tablet   No No   Sig: Take 1 tablet (600 mg total) by mouth every 6 (six) hours as needed for moderate pain      Facility-Administered Medications: None       Past Medical History:   Diagnosis Date   • Asthma        History reviewed  No pertinent surgical history  History reviewed  No pertinent family history  I have reviewed and agree with the history as documented      E-Cigarette/Vaping   • E-Cigarette Use Current Some Day User      E-Cigarette/Vaping Substances   • Nicotine No    • THC No    • CBD No    • Flavoring No    • Other No    • Unknown No      Social History     Tobacco Use   • Smoking status: Never   • Smokeless tobacco: Never   Vaping Use   • Vaping Use: Some days   Substance Use Topics   • Alcohol use: No   • Drug use: Yes     Types: Marijuana       Review of Systems   Constitutional: Negative for activity change, appetite change, chills and fever  Respiratory: Negative for shortness of breath  Cardiovascular: Negative for chest pain  Gastrointestinal: Negative for abdominal pain, nausea and vomiting  Endocrine: Negative for polydipsia, polyphagia and polyuria  Musculoskeletal: Negative for back pain and neck pain  Skin: Positive for wound  Neurological: Negative for weakness and numbness  All other systems reviewed and are negative  Physical Exam  Physical Exam  Vitals reviewed  Constitutional:       General: He is not in acute distress  Appearance: Normal appearance  He is not ill-appearing, toxic-appearing or diaphoretic  HENT:      Head: Normocephalic and atraumatic  Right Ear: External ear normal       Left Ear: External ear normal       Nose: Nose normal    Eyes:      General: No scleral icterus  Right eye: No discharge  Left eye: No discharge  Extraocular Movements: Extraocular movements intact  Cardiovascular:      Rate and Rhythm: Normal rate and regular rhythm  Pulmonary:      Effort: Pulmonary effort is normal  No respiratory distress  Musculoskeletal:         General: No swelling, tenderness, deformity or signs of injury  Comments: No midline c/t spine tenderness; no tenderness to R shoulder, elbow, wrist, fingers; symptoms grossly unreproducible with phalen, percussing ulnar nerve at elbow; 5/5 strength in b/l UE, ROM intact   Skin:     General: Skin is warm  Coloration: Skin is not jaundiced or pale        Comments: Calloused wound to L 5th finger, palmar aspect; no erythema, wound drainage, swelling, tenderness, ROM intact   Neurological:      General: No focal deficit present  Mental Status: He is alert  Mental status is at baseline  Sensory: No sensory deficit  Motor: No weakness  Vital Signs  ED Triage Vitals [11/27/22 1528]   Temperature Pulse Respirations Blood Pressure SpO2   97 6 °F (36 4 °C) 66 16 116/68 96 %      Temp Source Heart Rate Source Patient Position - Orthostatic VS BP Location FiO2 (%)   Temporal Monitor Lying Right arm --      Pain Score       8           Vitals:    11/27/22 1528   BP: 116/68   Pulse: 66   Patient Position - Orthostatic VS: Lying         Visual Acuity      ED Medications  Medications - No data to display    Diagnostic Studies  Results Reviewed     None                 No orders to display              Procedures  Procedures         ED Course                                             MDM  Number of Diagnoses or Management Options  Peripheral neuropathy  Diagnosis management comments: 44-year-old male presents for evaluation of left hand and forearm paresthesia  He denies known injury, he is left handed  He does report repetitive use at work and with his daily activities  Patient likely suffering from peripheral neuropathy  Patient's concern for diabetes however this is unlikely the source given fasting glucose in January of this year was within normal, patient has no other typical characteristics diabetes, also neuropathy more of a long-term consequence of diabetes    Will provide patient with pre-made cock-up wrist splint, he is encouraged to follow up with his primary care provider for the neuropathy as well as in A1c check if his concerns for diabetes persist       Disposition  Final diagnoses:   Peripheral neuropathy     Time reflects when diagnosis was documented in both MDM as applicable and the Disposition within this note     Time User Action Codes Description Comment    11/27/2022  4:06 PM Josefa Torres [G62 [id number]] Peripheral neuropathy       ED Disposition     ED Disposition   Discharge    Condition   Stable    Date/Time   Sun Nov 27, 2022  4:06 PM    Comment   [place] discharge to home/self care  Follow-up Information     Follow up With Specialties Details Why Contact Info Additional Information    Dayana Mac DO Family Medicine Schedule an appointment as soon as possible for a visit  Follow up with your PCP for tingling in hand  An a1c is a blood test to estimate your blood sugars over the last few months  [address] [facility] Emergency Department Emergency Medicine  If symptoms worsen [facility] [phone]  [facility] Emergency Department[address], [address]          Discharge Medication List as of 11/27/2022  4:10 PM      CONTINUE these medications which have NOT CHANGED    Details   ibuprofen (MOTRIN) 600 mg tablet Take 1 tablet (600 mg total) by mouth every 6 (six) hours as needed for moderate pain, Starting Thu 10/28/2021, Normal             No discharge procedures on file      PDMP Review     None          ED Provider  Electronically Signed by           Nasima Carson DO  11/27/22 8051

## 2023-01-30 ENCOUNTER — HOSPITAL ENCOUNTER (OUTPATIENT)
Facility: MEDICAL CENTER | Age: 43
End: 2023-01-30
Attending: SURGERY | Admitting: SURGERY
Payer: MEDICAID

## 2023-04-10 ENCOUNTER — NON-PROVIDER VISIT (OUTPATIENT)
Dept: URGENT CARE | Facility: PHYSICIAN GROUP | Age: 43
End: 2023-04-10

## 2023-04-10 DIAGNOSIS — Z02.1 PRE-EMPLOYMENT DRUG SCREENING: ICD-10-CM

## 2023-04-10 PROCEDURE — 80305 DRUG TEST PRSMV DIR OPT OBS: CPT | Performed by: PHYSICIAN ASSISTANT

## 2024-04-19 ENCOUNTER — OFFICE VISIT (OUTPATIENT)
Dept: URGENT CARE | Facility: MEDICAL CENTER | Age: 44
End: 2024-04-19
Payer: COMMERCIAL

## 2024-04-19 VITALS
HEART RATE: 71 BPM | SYSTOLIC BLOOD PRESSURE: 115 MMHG | OXYGEN SATURATION: 99 % | DIASTOLIC BLOOD PRESSURE: 84 MMHG | RESPIRATION RATE: 16 BRPM | TEMPERATURE: 97.6 F | HEIGHT: 69 IN | WEIGHT: 170 LBS | BODY MASS INDEX: 25.18 KG/M2

## 2024-04-19 DIAGNOSIS — R10.32 LEFT GROIN PAIN: Primary | ICD-10-CM

## 2024-04-19 LAB
SL AMB  POCT GLUCOSE, UA: NORMAL
SL AMB LEUKOCYTE ESTERASE,UA: NORMAL
SL AMB POCT BILIRUBIN,UA: NORMAL
SL AMB POCT BLOOD,UA: NORMAL
SL AMB POCT CLARITY,UA: CLEAR
SL AMB POCT COLOR,UA: YELLOW
SL AMB POCT KETONES,UA: NORMAL
SL AMB POCT NITRITE,UA: NORMAL
SL AMB POCT PH,UA: 6.5
SL AMB POCT SPECIFIC GRAVITY,UA: 1
SL AMB POCT URINE PROTEIN: NORMAL
SL AMB POCT UROBILINOGEN: 0.2

## 2024-04-19 PROCEDURE — S9088 SERVICES PROVIDED IN URGENT: HCPCS

## 2024-04-19 PROCEDURE — 81002 URINALYSIS NONAUTO W/O SCOPE: CPT

## 2024-04-19 PROCEDURE — 99212 OFFICE O/P EST SF 10 MIN: CPT

## 2024-04-19 NOTE — PROGRESS NOTES
[facility]        NAME: Stanton Connor is a 43 y.o. male  : 1980    MRN: [id number]  DATE: 2024  TIME: 5:42 PM    Assessment and Plan   Left groin pain [R10.32]  1. Left groin pain  POCT urine dip            Patient Instructions       Follow up with PCP in 3-5 days.  Proceed to  ER if symptoms worsen.    If tests are performed, our office will contact you with results only if changes need to made to the care plan discussed with you at the visit. You can review your full results on [facility].    Chief Complaint     Chief Complaint   Patient presents with   • Abdominal Pain     Left side abdominal pain that radiated to left groin  area x 1 year but yesterday it is a non stop pain          History of Present Illness       Patient here with left groin pain which started over the past year but reports yesterday it became worse. He reports the pain in the left abdomen and also reports pain down his left leg. Denies any bowel or bladder problems. Pain gets worse with lifting things. He has not taken anything for his pain. No fever or chills. No prior abdominal surgeries. Patient reports working in a [employer] lifting stuff and reports pain increased with lifting. He does drink a lot of iced tea. Denies any blood in stools or urine. No blood in urine on POC urine. No acute findings on POC urine. He has not noted any lump/bumps in his groin. He has not noticed any swelling of his testicles. It is noticeable with ambulation that he is favoring his left side/groin. Recommend that he follow up with Primary as I do not appreciate any acute issues. Recommend use of tylenol/ibuprofen and rest for the next few days to see if he has improvement. Strict Er precautions reviewed.         Review of Systems   Review of Systems   Constitutional:  Negative for appetite change, chills, fatigue and fever.   Respiratory:  Negative for cough and shortness of breath.    Cardiovascular:  Negative for chest  "pain and palpitations.   Gastrointestinal:  Positive for abdominal pain. Negative for constipation, diarrhea, nausea and vomiting.   Genitourinary:  Positive for testicular pain. Negative for difficulty urinating, dysuria, flank pain, frequency, penile discharge, penile pain, penile swelling, scrotal swelling and urgency.        Left groin pain/tenderness.    Skin:  Negative for color change and rash.   Neurological:  Negative for dizziness, light-headedness and headaches.         Current Medications       Current Outpatient Medications:   •  ibuprofen (MOTRIN) 600 mg tablet, Take 1 tablet (600 mg total) by mouth every 6 (six) hours as needed for moderate pain, Disp: 30 tablet, Rfl: 0    Current Allergies     Allergies as of 04/19/2024   • (No Known Allergies)            The following portions of the patient's history were reviewed and updated as appropriate: allergies, current medications, past family history, past medical history, past social history, past surgical history and problem list.     Past Medical History:   Diagnosis Date   • Asthma        History reviewed. No pertinent surgical history.    History reviewed. No pertinent family history.      Medications have been verified.        Objective   /84   Pulse 71   Temp 97.6 °F (36.4 °C)   Resp 16   Ht 5' 9\" (1.753 m)   Wt 77.1 kg (170 lb)   SpO2 99%   BMI 25.10 kg/m²        Physical Exam     Physical Exam  Vitals and nursing note reviewed.   Constitutional:       General: He is not in acute distress.     Appearance: Normal appearance. He is normal weight. He is not ill-appearing.   HENT:      Head: Normocephalic and atraumatic.      Nose: Nose normal.      Mouth/Throat:      Lips: Pink.      Mouth: Mucous membranes are moist.      Pharynx: Oropharynx is clear.   Eyes:      Extraocular Movements: Extraocular movements intact.      Conjunctiva/sclera: Conjunctivae normal.      Pupils: Pupils are equal, round, and reactive to light. "   Cardiovascular:      Rate and Rhythm: Normal rate and regular rhythm.      Pulses: Normal pulses.      Heart sounds: Normal heart sounds.   Pulmonary:      Effort: Pulmonary effort is normal.      Breath sounds: Normal breath sounds.   Abdominal:      General: Abdomen is flat. Bowel sounds are normal.      Palpations: Abdomen is soft.      Tenderness: There is abdominal tenderness in the left upper quadrant and left lower quadrant.      Hernia: There is no hernia in the left inguinal area or right inguinal area.       Genitourinary:     Testes:         Right: Tenderness or swelling not present. Right testis is descended.         Left: Tenderness or swelling not present. Left testis is descended.       Musculoskeletal:         General: Normal range of motion.      Cervical back: Full passive range of motion without pain, normal range of motion and neck supple.   Lymphadenopathy:      Lower Body: No right inguinal adenopathy. No left inguinal adenopathy.   Skin:     General: Skin is warm and dry.      Capillary Refill: Capillary refill takes less than 2 seconds.      Findings: No rash.   Neurological:      General: No focal deficit present.      Mental Status: He is alert and oriented to person, place, and time.   Psychiatric:         Mood and Affect: Mood normal.         Behavior: Behavior normal.

## 2024-04-19 NOTE — LETTER
April 19, 2024     Patient: Stanton Connor   YOB: 1980   Date of Visit: 4/19/2024       To Whom it May Concern:    Stanton Connor was seen in my clinic on 4/19/2024. He may return to work on 04/21/2024 .    If you have any questions or concerns, please don't hesitate to call.         Sincerely,          SILVANO Matthew        CC: No Recipients

## 2024-06-19 ENCOUNTER — HOSPITAL ENCOUNTER (EMERGENCY)
Facility: HOSPITAL | Age: 44
Discharge: HOME | End: 2024-06-19
Attending: EMERGENCY MEDICINE
Payer: COMMERCIAL

## 2024-06-19 ENCOUNTER — APPOINTMENT (EMERGENCY)
Dept: CT IMAGING | Facility: HOSPITAL | Age: 44
End: 2024-06-19
Payer: COMMERCIAL

## 2024-06-19 VITALS
HEART RATE: 70 BPM | HEIGHT: 69 IN | WEIGHT: 165 LBS | TEMPERATURE: 98 F | OXYGEN SATURATION: 97 % | DIASTOLIC BLOOD PRESSURE: 78 MMHG | RESPIRATION RATE: 22 BRPM | SYSTOLIC BLOOD PRESSURE: 142 MMHG | BODY MASS INDEX: 24.44 KG/M2

## 2024-06-19 DIAGNOSIS — R11.2 NAUSEA & VOMITING: ICD-10-CM

## 2024-06-19 DIAGNOSIS — R51.9 HEADACHE: Primary | ICD-10-CM

## 2024-06-19 LAB
ALBUMIN SERPL BCP-MCNC: 4.7 G/DL (ref 3.5–5)
ALP SERPL-CCNC: 77 U/L (ref 34–104)
ALT SERPL W P-5'-P-CCNC: 11 U/L (ref 7–52)
ANION GAP SERPL CALCULATED.3IONS-SCNC: 11 MMOL/L (ref 4–13)
AST SERPL W P-5'-P-CCNC: 13 U/L (ref 13–39)
BACTERIA UR QL AUTO: NORMAL /HPF
BASOPHILS # BLD AUTO: 0.03 THOUSANDS/ÂΜL (ref 0–0.1)
BASOPHILS NFR BLD AUTO: 0 % (ref 0–1)
BILIRUB SERPL-MCNC: 0.67 MG/DL (ref 0.2–1)
BILIRUB UR QL STRIP: NEGATIVE
BUN SERPL-MCNC: 13 MG/DL (ref 5–25)
CALCIUM SERPL-MCNC: 9.9 MG/DL (ref 8.4–10.2)
CARDIAC TROPONIN I PNL SERPL HS: 3 NG/L
CHLORIDE SERPL-SCNC: 103 MMOL/L (ref 96–108)
CK SERPL-CCNC: 104 U/L (ref 39–308)
CLARITY UR: ABNORMAL
CO2 SERPL-SCNC: 25 MMOL/L (ref 21–32)
COLOR UR: COLORLESS
CREAT SERPL-MCNC: 1.09 MG/DL (ref 0.6–1.3)
EOSINOPHIL # BLD AUTO: 0.01 THOUSAND/ÂΜL (ref 0–0.61)
EOSINOPHIL NFR BLD AUTO: 0 % (ref 0–6)
ERYTHROCYTE [DISTWIDTH] IN BLOOD BY AUTOMATED COUNT: 13.5 % (ref 11.6–15.1)
FLUAV RNA RESP QL NAA+PROBE: NEGATIVE
FLUBV RNA RESP QL NAA+PROBE: NEGATIVE
GFR SERPL CREATININE-BSD FRML MDRD: 83 ML/MIN/1.73SQ M
GLUCOSE SERPL-MCNC: 116 MG/DL (ref 65–140)
GLUCOSE UR STRIP-MCNC: NEGATIVE MG/DL
HCT VFR BLD AUTO: 42.4 % (ref 36.5–49.3)
HGB BLD-MCNC: 13.7 G/DL (ref 12–17)
HGB UR QL STRIP.AUTO: NEGATIVE
IMM GRANULOCYTES # BLD AUTO: 0.03 THOUSAND/UL (ref 0–0.2)
IMM GRANULOCYTES NFR BLD AUTO: 0 % (ref 0–2)
KETONES UR STRIP-MCNC: ABNORMAL MG/DL
LEUKOCYTE ESTERASE UR QL STRIP: NEGATIVE
LIPASE SERPL-CCNC: 31 U/L (ref 11–82)
LYMPHOCYTES # BLD AUTO: 1.76 THOUSANDS/ÂΜL (ref 0.6–4.47)
LYMPHOCYTES NFR BLD AUTO: 17 % (ref 14–44)
MAGNESIUM SERPL-MCNC: 2.3 MG/DL (ref 1.9–2.7)
MCH RBC QN AUTO: 29.3 PG (ref 26.8–34.3)
MCHC RBC AUTO-ENTMCNC: 32.3 G/DL (ref 31.4–37.4)
MCV RBC AUTO: 91 FL (ref 82–98)
MONOCYTES # BLD AUTO: 0.47 THOUSAND/ÂΜL (ref 0.17–1.22)
MONOCYTES NFR BLD AUTO: 4 % (ref 4–12)
NEUTROPHILS # BLD AUTO: 8.29 THOUSANDS/ÂΜL (ref 1.85–7.62)
NEUTS SEG NFR BLD AUTO: 79 % (ref 43–75)
NITRITE UR QL STRIP: NEGATIVE
NON-SQ EPI CELLS URNS QL MICRO: NORMAL /HPF
NRBC BLD AUTO-RTO: 0 /100 WBCS
PH UR STRIP.AUTO: 7 [PH]
PLATELET # BLD AUTO: 328 THOUSANDS/UL (ref 149–390)
PMV BLD AUTO: 9.8 FL (ref 8.9–12.7)
POTASSIUM SERPL-SCNC: 3.5 MMOL/L (ref 3.5–5.3)
PROT SERPL-MCNC: 7.5 G/DL (ref 6.4–8.4)
PROT UR STRIP-MCNC: ABNORMAL MG/DL
RBC # BLD AUTO: 4.68 MILLION/UL (ref 3.88–5.62)
RBC #/AREA URNS AUTO: NORMAL /HPF
RSV RNA RESP QL NAA+PROBE: NEGATIVE
SARS-COV-2 RNA RESP QL NAA+PROBE: NEGATIVE
SODIUM SERPL-SCNC: 139 MMOL/L (ref 135–147)
SP GR UR STRIP.AUTO: <1.005 (ref 1–1.03)
UROBILINOGEN UR STRIP-ACNC: <2 MG/DL
WBC # BLD AUTO: 10.59 THOUSAND/UL (ref 4.31–10.16)
WBC #/AREA URNS AUTO: NORMAL /HPF

## 2024-06-19 PROCEDURE — 36415 COLL VENOUS BLD VENIPUNCTURE: CPT | Performed by: PHYSICIAN ASSISTANT

## 2024-06-19 PROCEDURE — 82550 ASSAY OF CK (CPK): CPT | Performed by: PHYSICIAN ASSISTANT

## 2024-06-19 PROCEDURE — 84484 ASSAY OF TROPONIN QUANT: CPT | Performed by: PHYSICIAN ASSISTANT

## 2024-06-19 PROCEDURE — 0241U HB NFCT DS VIR RESP RNA 4 TRGT: CPT | Performed by: PHYSICIAN ASSISTANT

## 2024-06-19 PROCEDURE — 83690 ASSAY OF LIPASE: CPT | Performed by: PHYSICIAN ASSISTANT

## 2024-06-19 PROCEDURE — 70498 CT ANGIOGRAPHY NECK: CPT

## 2024-06-19 PROCEDURE — 93005 ELECTROCARDIOGRAM TRACING: CPT

## 2024-06-19 PROCEDURE — 96375 TX/PRO/DX INJ NEW DRUG ADDON: CPT

## 2024-06-19 PROCEDURE — 99284 EMERGENCY DEPT VISIT MOD MDM: CPT

## 2024-06-19 PROCEDURE — 80053 COMPREHEN METABOLIC PANEL: CPT | Performed by: PHYSICIAN ASSISTANT

## 2024-06-19 PROCEDURE — 99285 EMERGENCY DEPT VISIT HI MDM: CPT | Performed by: PHYSICIAN ASSISTANT

## 2024-06-19 PROCEDURE — 96365 THER/PROPH/DIAG IV INF INIT: CPT

## 2024-06-19 PROCEDURE — 70496 CT ANGIOGRAPHY HEAD: CPT

## 2024-06-19 PROCEDURE — 85025 COMPLETE CBC W/AUTO DIFF WBC: CPT | Performed by: PHYSICIAN ASSISTANT

## 2024-06-19 PROCEDURE — 81001 URINALYSIS AUTO W/SCOPE: CPT | Performed by: PHYSICIAN ASSISTANT

## 2024-06-19 PROCEDURE — 83735 ASSAY OF MAGNESIUM: CPT | Performed by: PHYSICIAN ASSISTANT

## 2024-06-19 RX ORDER — SODIUM CHLORIDE, SODIUM GLUCONATE, SODIUM ACETATE, POTASSIUM CHLORIDE, MAGNESIUM CHLORIDE, SODIUM PHOSPHATE, DIBASIC, AND POTASSIUM PHOSPHATE .53; .5; .37; .037; .03; .012; .00082 G/100ML; G/100ML; G/100ML; G/100ML; G/100ML; G/100ML; G/100ML
1000 INJECTION, SOLUTION INTRAVENOUS ONCE
Status: COMPLETED | OUTPATIENT
Start: 2024-06-19 | End: 2024-06-19

## 2024-06-19 RX ORDER — ONDANSETRON 2 MG/ML
4 INJECTION INTRAMUSCULAR; INTRAVENOUS ONCE
Status: COMPLETED | OUTPATIENT
Start: 2024-06-19 | End: 2024-06-19

## 2024-06-19 RX ORDER — KETOROLAC TROMETHAMINE 30 MG/ML
15 INJECTION, SOLUTION INTRAMUSCULAR; INTRAVENOUS ONCE
Status: COMPLETED | OUTPATIENT
Start: 2024-06-19 | End: 2024-06-19

## 2024-06-19 RX ORDER — ONDANSETRON 4 MG/1
4 TABLET, ORALLY DISINTEGRATING ORAL EVERY 6 HOURS PRN
Qty: 12 TABLET | Refills: 0 | Status: SHIPPED | OUTPATIENT
Start: 2024-06-19

## 2024-06-19 RX ADMIN — IOHEXOL 85 ML: 350 INJECTION, SOLUTION INTRAVENOUS at 16:48

## 2024-06-19 RX ADMIN — SODIUM CHLORIDE, SODIUM GLUCONATE, SODIUM ACETATE, POTASSIUM CHLORIDE, MAGNESIUM CHLORIDE, SODIUM PHOSPHATE, DIBASIC, AND POTASSIUM PHOSPHATE 1000 ML: .53; .5; .37; .037; .03; .012; .00082 INJECTION, SOLUTION INTRAVENOUS at 15:38

## 2024-06-19 RX ADMIN — ONDANSETRON HYDROCHLORIDE 4 MG: 2 INJECTION, SOLUTION INTRAVENOUS at 15:38

## 2024-06-19 RX ADMIN — KETOROLAC TROMETHAMINE 15 MG: 30 INJECTION, SOLUTION INTRAMUSCULAR; INTRAVENOUS at 15:39
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
## 2024-06-19 NOTE — DISCHARGE INSTRUCTIONS
Rest, plenty of fluids.  Zofran as needed for nausea, vomiting.  OTC tylenol and ibuprofen as needed for headache/discomfort.  Follow up with PCP or return to ER as needed.

## 2024-06-19 NOTE — Clinical Note
Stanton Connor was seen and treated in our emergency department on 6/19/2024.                Diagnosis:     Stanton  .    He may return on this date: 06/21/2024         If you have any questions or concerns, please don't hesitate to call.      Cuort Spivey PA-C    ______________________________           _______________          _______________  Hospital Representative                              Date                                Time

## 2024-06-19 NOTE — Clinical Note
Stanton Connor was seen and treated in our emergency department on 6/19/2024.                Diagnosis:     Stanton  .    He may return on this date: 06/21/2024         If you have any questions or concerns, please don't hesitate to call.      Court Spivey PA-C    ______________________________           _______________          _______________  Hospital Representative                              Date                                Time

## 2024-06-20 LAB
ATRIAL RATE: 60 BPM
P AXIS: 66 DEGREES
PR INTERVAL: 156 MS
QRS AXIS: 56 DEGREES
QRSD INTERVAL: 94 MS
QT INTERVAL: 412 MS
QTC INTERVAL: 412 MS
T WAVE AXIS: 40 DEGREES
VENTRICULAR RATE: 60 BPM

## 2024-06-20 PROCEDURE — 93010 ELECTROCARDIOGRAM REPORT: CPT | Performed by: INTERNAL MEDICINE

## 2024-09-06 ENCOUNTER — TELEPHONE (OUTPATIENT)
Dept: DENTISTRY | Facility: CLINIC | Age: 44
End: 2024-09-06

## 2024-09-06 NOTE — TELEPHONE ENCOUNTER
PT called to make dental appt.      PT reports that he has pain in his mouth and head and throat for some time now that he believes is a dental issue since the last time it happened he realized it was after eating candy.    I referred PT to PCP, not sure it's related to dental, put him on CX list after explaining that to him in detail, and asked that he keep me updated after he contacts PCP.  PT said he will call PCP today.    SB

## 2025-01-16 ENCOUNTER — OFFICE VISIT (OUTPATIENT)
Dept: URGENT CARE | Facility: MEDICAL CENTER | Age: 45
End: 2025-01-16
Payer: COMMERCIAL

## 2025-01-16 VITALS
WEIGHT: 165 LBS | HEIGHT: 69 IN | RESPIRATION RATE: 18 BRPM | TEMPERATURE: 98.7 F | DIASTOLIC BLOOD PRESSURE: 80 MMHG | SYSTOLIC BLOOD PRESSURE: 140 MMHG | BODY MASS INDEX: 24.44 KG/M2 | OXYGEN SATURATION: 99 % | HEART RATE: 69 BPM

## 2025-01-16 DIAGNOSIS — T30.0 BURN: ICD-10-CM

## 2025-01-16 DIAGNOSIS — K21.00 GASTROESOPHAGEAL REFLUX DISEASE WITH ESOPHAGITIS WITHOUT HEMORRHAGE: Primary | ICD-10-CM

## 2025-01-16 PROCEDURE — S9088 SERVICES PROVIDED IN URGENT: HCPCS | Performed by: PHYSICIAN ASSISTANT

## 2025-01-16 PROCEDURE — 99213 OFFICE O/P EST LOW 20 MIN: CPT | Performed by: PHYSICIAN ASSISTANT
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
## 2025-01-16 NOTE — PATIENT INSTRUCTIONS
For sore throat: resume heartburn medication and try elevating the head of the bed.  If sore throat continues despite 2 weeks of regular heart burn medication make follow up appointment with ENT'  For burns:  apply triple antibiotic or neosporin and keep covered with a band aid, especially while working.    Follow up with PCP in 3-5 days.  Proceed to  ER if symptoms worsen.    If tests have been performed at [facility], our office will contact you with results if changes need to be made to the care plan discussed with you at the visit.  You can review your full results on [facility].

## 2025-01-22 ENCOUNTER — OFFICE VISIT (OUTPATIENT)
Dept: URGENT CARE | Facility: MEDICAL CENTER | Age: 45
End: 2025-01-22
Payer: COMMERCIAL

## 2025-01-22 ENCOUNTER — APPOINTMENT (OUTPATIENT)
Dept: RADIOLOGY | Facility: MEDICAL CENTER | Age: 45
End: 2025-01-22
Payer: COMMERCIAL

## 2025-01-22 VITALS
SYSTOLIC BLOOD PRESSURE: 110 MMHG | RESPIRATION RATE: 16 BRPM | DIASTOLIC BLOOD PRESSURE: 78 MMHG | TEMPERATURE: 98.2 F | OXYGEN SATURATION: 98 % | HEART RATE: 76 BPM

## 2025-01-22 DIAGNOSIS — S90.112A CONTUSION OF LEFT GREAT TOE WITHOUT DAMAGE TO NAIL, INITIAL ENCOUNTER: ICD-10-CM

## 2025-01-22 DIAGNOSIS — S90.112A CONTUSION OF LEFT GREAT TOE WITHOUT DAMAGE TO NAIL, INITIAL ENCOUNTER: Primary | ICD-10-CM

## 2025-01-22 PROCEDURE — 99214 OFFICE O/P EST MOD 30 MIN: CPT | Performed by: NURSE PRACTITIONER

## 2025-01-22 PROCEDURE — 73660 X-RAY EXAM OF TOE(S): CPT

## 2025-01-22 PROCEDURE — S9088 SERVICES PROVIDED IN URGENT: HCPCS | Performed by: NURSE PRACTITIONER

## 2025-01-22 RX ORDER — IBUPROFEN 600 MG/1
600 TABLET, FILM COATED ORAL 3 TIMES DAILY PRN
Qty: 30 TABLET | Refills: 0 | Status: SHIPPED | OUTPATIENT
Start: 2025-01-22

## 2025-01-22 NOTE — PROGRESS NOTES
[facility]        NAME: Stanton Connor is a 44 y.o. male  : 1980    MRN: [id number]  DATE: 2025  TIME: 5:12 PM    Assessment and Plan   Contusion of left great toe without damage to nail, initial encounter [S90.112A]  1. Contusion of left great toe without damage to nail, initial encounter  XR toe left great min 2 views    ibuprofen (MOTRIN) 600 mg tablet    Post Op Shoe            Patient Instructions       No fracture on wet read  Ibuprofen for pain and swelling  Postop shoes for comfort  Follow up with PCP in 3-5 days.  Proceed to  ER if symptoms worsen.    If tests have been performed at [facility], our office will contact you with results if changes need to be made to the care plan discussed with you at the visit.  [facility]    Chief Complaint     Chief Complaint   Patient presents with    Toe Pain     LEFT GREAT TOE PAIN UP TO KNEE,HIT TOE WITH HAMMER THIS AM          History of Present Illness       HPI  Presents to clinic with complaint of pain to the left great toe. States he was splitting wood using a hammer and he got off his hands, landing on the left great toe. Has been in pain since. This was several hours ago. Pain radiates upward on the lower portion of the left leg. Pain is severe.  Sharp/achy. Worse with walking. Better at rest. States he is unable to put his left foot into his shoes due to the pain    Review of Systems   Review of Systems   Musculoskeletal:  Positive for arthralgias (left great toe) and joint swelling.   Skin:  Negative for color change, rash and wound.         Current Medications       Current Outpatient Medications:     ibuprofen (MOTRIN) 600 mg tablet, Take 1 tablet (600 mg total) by mouth 3 (three) times a day as needed for mild pain, Disp: 30 tablet, Rfl: 0    ibuprofen (MOTRIN) 600 mg tablet, Take 1 tablet (600 mg total) by mouth every 6 (six) hours as needed for moderate pain (Patient not taking:  Reported on 1/16/2025), Disp: 30 tablet, Rfl: 0    ondansetron (ZOFRAN-ODT) 4 mg disintegrating tablet, Take 1 tablet (4 mg total) by mouth every 6 (six) hours as needed for vomiting or nausea (Patient not taking: Reported on 1/16/2025), Disp: 12 tablet, Rfl: 0    Current Allergies     Allergies as of 01/22/2025    (No Known Allergies)            The following portions of the patient's history were reviewed and updated as appropriate: allergies, current medications, past family history, past medical history, past social history, past surgical history and problem list.     Past Medical History:   Diagnosis Date    Asthma        History reviewed. No pertinent surgical history.    History reviewed. No pertinent family history.      Medications have been verified.        Objective   /78   Pulse 76   Temp 98.2 °F (36.8 °C)   Resp 16   SpO2 98%   No LMP for male patient.       Physical Exam     Physical Exam  Musculoskeletal:         General: Swelling (moderate swelling of the L great toe) and tenderness (with palpation of the toe and with passive ROM of the toe) present. No deformity.   Skin:     Capillary Refill: Capillary refill takes less than 2 seconds.      Findings: No bruising.

## 2025-03-16 ENCOUNTER — OFFICE VISIT (OUTPATIENT)
Dept: URGENT CARE | Facility: MEDICAL CENTER | Age: 45
End: 2025-03-16
Payer: COMMERCIAL

## 2025-03-16 ENCOUNTER — APPOINTMENT (OUTPATIENT)
Dept: RADIOLOGY | Facility: MEDICAL CENTER | Age: 45
End: 2025-03-16
Payer: COMMERCIAL

## 2025-03-16 VITALS
BODY MASS INDEX: 25.03 KG/M2 | HEIGHT: 69 IN | RESPIRATION RATE: 16 BRPM | TEMPERATURE: 98.2 F | OXYGEN SATURATION: 97 % | HEART RATE: 72 BPM | DIASTOLIC BLOOD PRESSURE: 68 MMHG | SYSTOLIC BLOOD PRESSURE: 100 MMHG | WEIGHT: 169 LBS

## 2025-03-16 DIAGNOSIS — S61.219A LACERATION OF FINGER WITH INFECTION, INITIAL ENCOUNTER: ICD-10-CM

## 2025-03-16 DIAGNOSIS — S80.812A INFECTED ABRASION OF SKIN OF LEFT LOWER LEG: Primary | ICD-10-CM

## 2025-03-16 DIAGNOSIS — S80.12XA CONTUSION OF LEFT LOWER LEG, INITIAL ENCOUNTER: ICD-10-CM

## 2025-03-16 DIAGNOSIS — R20.2 PARESTHESIA OF HAND, BILATERAL: ICD-10-CM

## 2025-03-16 DIAGNOSIS — L08.9 LACERATION OF FINGER WITH INFECTION, INITIAL ENCOUNTER: ICD-10-CM

## 2025-03-16 DIAGNOSIS — L08.9 INFECTED ABRASION OF SKIN OF LEFT LOWER LEG: Primary | ICD-10-CM

## 2025-03-16 DIAGNOSIS — M79.662 PAIN IN LEFT LOWER LEG: ICD-10-CM

## 2025-03-16 PROCEDURE — 72050 X-RAY EXAM NECK SPINE 4/5VWS: CPT

## 2025-03-16 PROCEDURE — 73590 X-RAY EXAM OF LOWER LEG: CPT

## 2025-03-16 PROCEDURE — 99214 OFFICE O/P EST MOD 30 MIN: CPT | Performed by: NURSE PRACTITIONER

## 2025-03-16 PROCEDURE — S9088 SERVICES PROVIDED IN URGENT: HCPCS | Performed by: NURSE PRACTITIONER

## 2025-03-16 RX ORDER — CEPHALEXIN 500 MG/1
500 CAPSULE ORAL EVERY 8 HOURS SCHEDULED
Qty: 21 CAPSULE | Refills: 0 | Status: SHIPPED | OUTPATIENT
Start: 2025-03-16 | End: 2025-03-23

## 2025-03-16 RX ORDER — PREDNISONE 10 MG/1
TABLET ORAL
Qty: 24 TABLET | Refills: 0 | Status: SHIPPED | OUTPATIENT
Start: 2025-03-16

## 2025-03-16 NOTE — PROGRESS NOTES
[facility]        NAME: Stanton Connor is a 44 y.o. male  : 1980    MRN: [id number]  DATE: 2025  TIME: 11:57 AM    Assessment and Plan   Infected abrasion of skin of left lower leg [S80.812A, L08.9]  1. Infected abrasion of skin of left lower leg  cephalexin (KEFLEX) 500 mg capsule      2. Contusion of left lower leg, initial encounter        3. Pain in left lower leg  XR tibia fibula 2 vw left      4. Laceration of finger with infection, initial encounter  cephalexin (KEFLEX) 500 mg capsule    left ring finger      5. Paresthesia of hand, bilateral  XR spine cervical complete 4 or 5 vw non injury    predniSONE 10 mg tablet            Patient Instructions       Follow up with PCP in 3-5 days.  Proceed to  ER if symptoms worsen.    If tests have been performed at [facility], our office will contact you with results if changes need to be made to the care plan discussed with you at the visit.  You can review your full results on [facility].      You are to clean your leg and hand wounds daily with soap and water and apply plain bacitracin to the wounds daily x 4 days.  You are to take the cephalexin as prescribed for infection  Your tetanus was 2020.   You are to take tylenol  for pain - which may also help the hand numbness and tingling.      You should see your PCP about the hand issues as you may need further testing to rule out carpal tunnel issues.   You have been prescribed prednisone - take and see if this helps with hand issues.  You have been prescribed prednisone. Take with food. Do NOT take any NSAID products (motrin, ibuprofen, aleve, advil) while taking this medication.  You may take tylenol.       Your xray was preliminarily read by your provider.  A radiologist will read the xray and you will be notified if it is abnormal.      If tests have been performed at [facility], our office will contact you with results if changes need to be made to the care plan discussed with  "you at the visit.  You can review your full results on [facility].    Follow up with your PCP in 3-5 days  Go to the ED if symptoms worsen        Chief Complaint     Chief Complaint   Patient presents with    Leg Injury     Patient had a fall a few days ago and struck left shin on metal post. Patient c/o pain shooting to hip. Also c/o both hands tingling feeling tingling periodically for \"some time now\"         History of Present Illness       This is a 44 year old male who comes to [facility] with c/o left shin pain, swelling and a wound x 6 days. 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 He has a wound that is red, painful with radiation of leg pain to the hip and appears infected.   He denies falling on the hip.  Td 2020    denies this happened at work.   He also c/o left ring finger laceration at the MIP joint dorsal surface.  It appears infected   He also c/o 3 weeks of B/L hand numbness and tingling.  He states he does not remember an injury.  He states that he does have occasional neck pain so it could be coming from the neck.    He denies taking anything for his pain.    PMH is listed and reviewed.           Review of Systems   Review of Systems   Constitutional: Negative.    HENT: Negative.     Eyes: Negative.    Respiratory: Negative.     Cardiovascular: Negative.    Gastrointestinal: Negative.    Endocrine: Negative.    Genitourinary: Negative.    Musculoskeletal:  Positive for neck pain.        Left leg pain  Neck pain  Wound left lower leg and wound left ring finger      Allergic/Immunologic: Negative.    Neurological:  Positive for numbness.   Hematological: Negative.    Psychiatric/Behavioral: Negative.           Current Medications       Current Outpatient Medications:     cephalexin (KEFLEX) 500 mg capsule, Take 1 capsule (500 mg total) by mouth every 8 (eight) hours for 7 days, Disp: 21 capsule, Rfl: 0    ibuprofen (MOTRIN) 600 mg " "tablet, Take 1 tablet (600 mg total) by mouth 3 (three) times a day as needed for mild pain, Disp: 30 tablet, Rfl: 0    predniSONE 10 mg tablet, Take 5 tabs po x 2 days; 4 tabs po x 2 days; 3 tabs po x 1 day; 2 tabs po x 1 day. 1 tab po x 1 day., Disp: 24 tablet, Rfl: 0    ibuprofen (MOTRIN) 600 mg tablet, Take 1 tablet (600 mg total) by mouth every 6 (six) hours as needed for moderate pain (Patient not taking: Reported on 1/16/2025), Disp: 30 tablet, Rfl: 0    ondansetron (ZOFRAN-ODT) 4 mg disintegrating tablet, Take 1 tablet (4 mg total) by mouth every 6 (six) hours as needed for vomiting or nausea (Patient not taking: Reported on 1/16/2025), Disp: 12 tablet, Rfl: 0    Current Allergies     Allergies as of 03/16/2025    (No Known Allergies)            The following portions of the patient's history were reviewed and updated as appropriate: allergies, current medications, past family history, past medical history, past social history, past surgical history and problem list.     Past Medical History:   Diagnosis Date    Asthma        History reviewed. No pertinent surgical history.    History reviewed. No pertinent family history.      Medications have been verified.        Objective   /68   Pulse 72   Temp 98.2 °F (36.8 °C)   Resp 16   Ht 5' 9\" (1.753 m)   Wt 76.7 kg (169 lb)   SpO2 97%   BMI 24.96 kg/m²   No LMP for male patient.       Physical Exam     Physical Exam  Vitals and nursing note reviewed.   Constitutional:       General: He is not in acute distress.     Appearance: Normal appearance. He is normal weight. He is not ill-appearing, toxic-appearing or diaphoretic.   HENT:      Head: Normocephalic and atraumatic.      Nose: Nose normal.      Mouth/Throat:      Mouth: Mucous membranes are moist.   Eyes:      Extraocular Movements: Extraocular movements intact.   Neck:      Comments: No c spine tenderness with palpation.    Cardiovascular:      Rate and Rhythm: Normal rate and regular rhythm.    "   Pulses: Normal pulses.      Heart sounds: Normal heart sounds.   Pulmonary:      Effort: Pulmonary effort is normal. No respiratory distress.      Breath sounds: Normal breath sounds. No stridor. No wheezing, rhonchi or rales.   Chest:      Chest wall: No tenderness.   Musculoskeletal:         General: Normal range of motion.        Hands:       Cervical back: Normal range of motion and neck supple.      Comments: B/L UE NVI - FROM  Left hand:  FROM.  Negative finkelstein, phalens or tinels.  Laceration across MIP joint ring finger.  Redness. No drainage.   Right hand:  + phalens sign.  Neg finkelstein or tinels.    + radial pulse b/l  NVI B/L hands    Left lower leg:  quarter size open abrasion with redness. No drainage. There is yellow bruising noted.  + TTP.  Left is NVI.    No rotation or shortening of left leg to indicate hip injury     Muscle strength B/L UE and LE 5/5.    Skin:     General: Skin is warm and dry.      Capillary Refill: Capillary refill takes less than 2 seconds.      Findings: Bruising and erythema present.          Neurological:      General: No focal deficit present.      Mental Status: He is alert and oriented to person, place, and time.   Psychiatric:         Mood and Affect: Mood normal.         Behavior: Behavior normal.         Thought Content: Thought content normal.         Judgment: Judgment normal.       Preliminary reading left tib/fib  Negative for acute process  Waiting on rad read    C spine rad read preliminary   ? Narrowing C 6-T1  Waiting on rad read

## 2025-03-16 NOTE — PATIENT INSTRUCTIONS
You are to clean your leg and hand wounds daily with soap and water and apply plain bacitracin to the wounds daily x 4 days.  You are to take the cephalexin as prescribed for infection  Your tetanus was 2020.   You are to take tylenol  for pain - which may also help the hand numbness and tingling.      You should see your PCP about the hand issues as you may need further testing to rule out carpal tunnel issues.   You have been prescribed prednisone - take and see if this helps with hand issues.  You have been prescribed prednisone. Take with food. Do NOT take any NSAID products (motrin, ibuprofen, aleve, advil) while taking this medication.  You may take tylenol.       Your xray was preliminarily read by your provider.  A radiologist will read the xray and you will be notified if it is abnormal.      If tests have been performed at [facility], our office will contact you with results if changes need to be made to the care plan discussed with you at the visit.  You can review your full results on [facility].    Follow up with your PCP in 3-5 days  Go to the ED if symptoms worsen

## 2025-05-28 ENCOUNTER — OFFICE VISIT (OUTPATIENT)
Dept: URGENT CARE | Facility: MEDICAL CENTER | Age: 45
End: 2025-05-28
Payer: COMMERCIAL

## 2025-05-28 VITALS
OXYGEN SATURATION: 99 % | RESPIRATION RATE: 16 BRPM | BODY MASS INDEX: 24.31 KG/M2 | WEIGHT: 164.6 LBS | HEART RATE: 86 BPM | SYSTOLIC BLOOD PRESSURE: 128 MMHG | DIASTOLIC BLOOD PRESSURE: 82 MMHG | TEMPERATURE: 97.6 F

## 2025-05-28 DIAGNOSIS — Z71.1 WORRIED WELL: Primary | ICD-10-CM

## 2025-05-28 PROCEDURE — S9088 SERVICES PROVIDED IN URGENT: HCPCS | Performed by: PHYSICIAN ASSISTANT

## 2025-05-28 PROCEDURE — 99212 OFFICE O/P EST SF 10 MIN: CPT | Performed by: PHYSICIAN ASSISTANT

## 2025-05-28 NOTE — PROGRESS NOTES
[facility]        NAME: Stanton Connor is a 44 y.o. male  : 1980    MRN: [id number]  DATE: May 28, 2025  TIME: 7:55 PM    Assessment and Plan   Worried well [Z71.1]  1. Worried well              Patient Instructions       Follow up with PCP in 3-5 days.  Proceed to  ER if symptoms worsen.    If tests have been performed at [facility], our office will contact you with results if changes need to be made to the care plan discussed with you at the visit.  You can review your full results on [facility].    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 Patient is requesting to have feet checked to see if he stepped on any         History of Present Illness       Patient presents with worry he stepped on needles which were lying on the ground while working 2 days ago.  Patient had a work boots on and did not feel anything pierces sole.  Patient is requesting to have his feet checked to make sure he did not step on anything.        Review of Systems   Review of Systems   Constitutional:  Negative for chills and fever.   Respiratory:  Negative for cough.    Hematological:  Negative for adenopathy.         Current Medications     Current Medications[1]    Current Allergies     Allergies as of 2025    (No Known Allergies)            The following portions of the patient's history were reviewed and updated as appropriate: allergies, current medications, past family history, past medical history, past social history, past surgical history and problem list.     Past Medical History[2]    Past Surgical History[3]    Family History[4]      Medications have been verified.        Objective   /82   Pulse 86   Temp 97.6 °F (36.4 °C)   Resp 16   Wt 74.7 kg (164 lb 9.6 oz)   SpO2 99%   BMI 24.31 kg/m²   No LMP for male patient.       Physical Exam     Physical Exam  Vitals and nursing note  reviewed.   Constitutional:       Appearance: Normal appearance.   HENT:      Head: Normocephalic and atraumatic.     Cardiovascular:      Rate and Rhythm: Normal rate.   Pulmonary:      Effort: Pulmonary effort is normal.     Skin:     General: Skin is warm.      Comments: No visible puncture marks on the soles of either foot     Neurological:      Mental Status: He is alert.                        [1]   Current Outpatient Medications:     ibuprofen (MOTRIN) 600 mg tablet, Take 1 tablet (600 mg total) by mouth every 6 (six) hours as needed for moderate pain (Patient not taking: Reported on 5/28/2025), Disp: 30 tablet, Rfl: 0    ibuprofen (MOTRIN) 600 mg tablet, Take 1 tablet (600 mg total) by mouth 3 (three) times a day as needed for mild pain (Patient not taking: Reported on 5/28/2025), Disp: 30 tablet, Rfl: 0    ondansetron (ZOFRAN-ODT) 4 mg disintegrating tablet, Take 1 tablet (4 mg total) by mouth every 6 (six) hours as needed for vomiting or nausea (Patient not taking: Reported on 5/28/2025), Disp: 12 tablet, Rfl: 0    predniSONE 10 mg tablet, Take 5 tabs po x 2 days; 4 tabs po x 2 days; 3 tabs po x 1 day; 2 tabs po x 1 day. 1 tab po x 1 day. (Patient not taking: Reported on 5/28/2025), Disp: 24 tablet, Rfl: 0  [2]   Past Medical History:  Diagnosis Date    Asthma    [3] No past surgical history on file.  [4] No family history on file.

## 2025-08-04 ENCOUNTER — NON-PROVIDER VISIT (OUTPATIENT)
Dept: URGENT CARE | Facility: PHYSICIAN GROUP | Age: 45
End: 2025-08-04

## 2025-08-04 DIAGNOSIS — Z02.1 PRE-EMPLOYMENT DRUG SCREENING: Primary | ICD-10-CM

## 2025-08-04 LAB
AMP AMPHETAMINE: NORMAL
COC COCAINE: NORMAL
INT CON NEG: NORMAL
INT CON POS: NORMAL
MET METHAMPHETAMINES: NORMAL
OPI OPIATES: NORMAL
PCP PHENCYCLIDINE: NORMAL
POC DRUG COMMENT 753798-OCCUPATIONAL HEALTH: NEGATIVE
THC: NORMAL

## 2025-08-04 PROCEDURE — 80305 DRUG TEST PRSMV DIR OPT OBS: CPT | Performed by: NURSE PRACTITIONER

## (undated) DEVICE — SENSOR SPO2 NEO LNCS ADHESIVE (20/BX) SEE USER NOTES

## (undated) DEVICE — GLOVE BIOGEL SZ 6.5 SURGICAL PF LTX (50PR/BX 4BX/CA)

## (undated) DEVICE — GLOVE BIOGEL PI INDICATOR SZ 7.0 SURGICAL PF LF - (50/BX 4BX/CA)

## (undated) DEVICE — ELECTRODE 5MM LHK LAPSCP STERILE DISP- MEGADYNE  (5/CA)

## (undated) DEVICE — HEAD HOLDER JUNIOR/ADULT

## (undated) DEVICE — BAG RETRIEVAL 10ML (10EA/BX)

## (undated) DEVICE — GUIDE TRACHE TUBE INTUBATING STYLET 5.0-10.0MM 14FR (20EA/PK)

## (undated) DEVICE — CANISTER SUCTION 3000ML MECHANICAL FILTER AUTO SHUTOFF MEDI-VAC NONSTERILE LF DISP  (40EA/CA)

## (undated) DEVICE — PACK LAP CHOLE OR - (2EA/CA)

## (undated) DEVICE — TUBE E-T HI-LO CUFF 7.0MM (10EA/PK)

## (undated) DEVICE — CHLORAPREP 26 ML APPLICATOR - ORANGE TINT(25/CA)

## (undated) DEVICE — TUBE CONNECT SUCTION CLEAR 120 X 1/4" (50EA/CA)"

## (undated) DEVICE — ELECTRODE 850 FOAM ADHESIVE - HYDROGEL RADIOTRNSPRNT (50/PK)

## (undated) DEVICE — NEEDLE INSFL 120MM 14GA VRRS - (20/BX)

## (undated) DEVICE — PROTECTOR ULNA NERVE - (36PR/CA)

## (undated) DEVICE — CLIP MED LG INTNL HRZN TI ESCP - (20/BX)

## (undated) DEVICE — GLOVE SZ 7 BIOGEL PI MICRO - PF LF (50PR/BX 4BX/CA)

## (undated) DEVICE — LACTATED RINGERS INJ 1000 ML - (14EA/CA 60CA/PF)

## (undated) DEVICE — BLADE SURGICAL CLIPPER - (50EA/CA)

## (undated) DEVICE — APPLICATOR COTTON TIP 6 IN - STERILE (2EA/PK 100PK/BX)

## (undated) DEVICE — ALCOHOL PREP PAD STERILE MED - (200/BX)

## (undated) DEVICE — ELECTRODE DUAL RETURN W/ CORD - (50/PK)

## (undated) DEVICE — SCISSORS 5MM CVD (6EA/BX)

## (undated) DEVICE — SET SUCTION/IRRIGATION WITH DISPOSABLE TIP (6/CA )PART #0250-070-520 IS A SUB

## (undated) DEVICE — CANNULA W/SEAL 5X100 Z-THRE - ADED KII (12/BX)

## (undated) DEVICE — SUTURE 4-0 VICRYL PLUS FS-2 - 27 INCH (36/BX)

## (undated) DEVICE — MASK ANESTHESIA ADULT  - (100/CA)

## (undated) DEVICE — PENCIL ELECTSURG 10FT BTN SWH - (50/CA)

## (undated) DEVICE — SODIUM CHL IRRIGATION 0.9% 1000ML (12EA/CA)

## (undated) DEVICE — SUCTION INSTRUMENT YANKAUER BULBOUS TIP W/O VENT (50EA/CA)

## (undated) DEVICE — KIT ANESTHESIA W/CIRCUIT & 3/LT BAG W/FILTER (20EA/CA)

## (undated) DEVICE — GOWN WARMING STANDARD FLEX - (30/CA)

## (undated) DEVICE — KIT ROOM DECONTAMINATION

## (undated) DEVICE — TUBING CLEARLINK DUO-VENT - C-FLO (48EA/CA)

## (undated) DEVICE — GLOVE SZ 6.5 BIOGEL PI MICRO - PF LF (50PR/BX)

## (undated) DEVICE — SUTURE GENERAL

## (undated) DEVICE — GLOVE BIOGEL SZ 7 SURGICAL PF LTX - (50PR/BX 4BX/CA)

## (undated) DEVICE — NEPTUNE 4 PORT MANIFOLD - (20/PK)

## (undated) DEVICE — TROCAR 5X100 NON BLADED Z-TH - READ KII (6/BX)

## (undated) DEVICE — SUTURE 0 VICRYL PLUS UR-6 - 27 INCH (36/BX)

## (undated) DEVICE — SET LEADWIRE 5 LEAD BEDSIDE DISPOSABLE ECG (1SET OF 5/EA)

## (undated) DEVICE — TROCAR Z THREAD11MM OPTICAL - NON BLADED(6/BX)

## (undated) DEVICE — SET EXTENSION WITH 2 PORTS (48EA/CA) ***PART #2C8610 IS A SUBSTITUTE*****

## (undated) DEVICE — TROCAR LAPSCP 100MM 12MM NTHRD - (6/BX)